# Patient Record
Sex: FEMALE | Race: OTHER | HISPANIC OR LATINO | Employment: FULL TIME | URBAN - METROPOLITAN AREA
[De-identification: names, ages, dates, MRNs, and addresses within clinical notes are randomized per-mention and may not be internally consistent; named-entity substitution may affect disease eponyms.]

---

## 2017-10-01 ENCOUNTER — HOSPITAL ENCOUNTER (EMERGENCY)
Facility: HOSPITAL | Age: 32
Discharge: HOME | End: 2017-10-02
Attending: EMERGENCY MEDICINE
Payer: OTHER GOVERNMENT

## 2017-10-01 VITALS
RESPIRATION RATE: 18 BRPM | HEART RATE: 113 BPM | OXYGEN SATURATION: 99 % | BODY MASS INDEX: 24.19 KG/M2 | WEIGHT: 120 LBS | SYSTOLIC BLOOD PRESSURE: 115 MMHG | TEMPERATURE: 99 F | HEIGHT: 59 IN | DIASTOLIC BLOOD PRESSURE: 68 MMHG
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
## 2017-10-01 DIAGNOSIS — L03.115 CELLULITIS OF RIGHT LOWER EXTREMITY: ICD-10-CM

## 2017-10-01 PROCEDURE — 81025 URINE PREGNANCY TEST: CPT

## 2017-10-01 PROCEDURE — 99283 EMERGENCY DEPT VISIT LOW MDM: CPT | Mod: 25

## 2017-10-01 PROCEDURE — 99284 EMERGENCY DEPT VISIT MOD MDM: CPT | Mod: ,,,

## 2017-10-02 LAB
B-HCG UR QL: NEGATIVE
CTP QC/QA: YES

## 2017-10-02 RX ORDER — CEPHALEXIN 500 MG/1
500 CAPSULE ORAL EVERY 12 HOURS
Qty: 10 CAPSULE | Refills: 0 | Status: SHIPPED | OUTPATIENT
Start: 2017-10-02 | End: 2017-10-07

## 2017-10-02 RX ORDER — HYDROCORTISONE 1 %
CREAM (GRAM) TOPICAL
Qty: 30 G | Refills: 0 | Status: SHIPPED | OUTPATIENT
Start: 2017-10-02 | End: 2019-01-18

## 2017-10-02 NOTE — ED PROVIDER NOTES
Encounter Date: 10/1/2017       History     Chief Complaint   Patient presents with    Insect Bite     Pt has insect bite on right hand/right leg. Noticed Friday.      32 y.o. female with no significant past medical history presents to ED with insect bite. Patient states bite occurred on Friday night. Two bites noted, one to MIP of 1st digit and second to right thigh. Patient states redness of thigh has increased. Denies drainage, fever, chills, nausea, vomiting, chest pain, shortness of breath, weakness, syncope.           Review of patient's allergies indicates:  No Known Allergies  Past Medical History:   Diagnosis Date    Abnormal cervical Pap smear with positive HPV DNA test 2011    pt states negative biopsy    Abnormal Pap smear 2010    ADD (attention deficit disorder) 4/24/2014    Adderall XR 20mg qd, 5 mg plain in pm, tried Concerta but didn't help, ADD eval on chart 2013    Allergic dermatitis     takes medrol prior to & after getting tatoos bc of hives    Seborrheic dermatitis of scalp     ketoconazole 2% shampoo helps     Past Surgical History:   Procedure Laterality Date    breast augmentation      COLPOSCOPY       Family History   Problem Relation Age of Onset    Breast cancer Neg Hx     Cancer Neg Hx     Ovarian cancer Neg Hx      Social History   Substance Use Topics    Smoking status: Never Smoker    Smokeless tobacco: Never Used    Alcohol use Yes      Comment: socially     Review of Systems   Constitutional: Negative for chills, diaphoresis, fatigue and fever.   HENT: Negative for congestion and sore throat.    Respiratory: Negative for cough and shortness of breath.    Cardiovascular: Negative for chest pain.   Gastrointestinal: Negative for abdominal pain and nausea.   Genitourinary: Negative for dysuria.   Musculoskeletal: Negative for back pain.   Skin: Positive for wound. Negative for rash.        itching   Neurological: Negative for weakness, light-headedness and headaches.    Hematological: Does not bruise/bleed easily.   Psychiatric/Behavioral: The patient is not nervous/anxious.        Physical Exam     Initial Vitals [10/01/17 2319]   BP Pulse Resp Temp SpO2   115/68 (!) 113 18 98.5 °F (36.9 °C) 99 %      MAP       83.67         Physical Exam    Vitals reviewed.  Constitutional: Vital signs are normal. She appears well-developed and well-nourished. She is not diaphoretic. No distress.   HENT:   Head: Normocephalic and atraumatic.   Nose: Nose normal.   Mouth/Throat: Oropharynx is clear and moist.   Eyes: Conjunctivae, EOM and lids are normal. Pupils are equal, round, and reactive to light. Lids are everted and swept, no foreign bodies found. Right eye exhibits no discharge. Left eye exhibits no discharge.   Neck: Trachea normal and normal range of motion. Neck supple.   Cardiovascular: Normal rate, regular rhythm, intact distal pulses and normal pulses.   Pulmonary/Chest: Breath sounds normal. She has no wheezes. She has no rhonchi. She has no rales. She exhibits no tenderness.   Abdominal: Soft. Normal appearance and bowel sounds are normal. There is no tenderness. There is no rebound.   Musculoskeletal: Normal range of motion. She exhibits no edema or tenderness.   Neurological: She is alert and oriented to person, place, and time. She has normal strength. No sensory deficit.   Skin: Skin is warm. Capillary refill takes less than 2 seconds. No abscess noted. There is erythema. No cyanosis.        6.5cm x 4cm cellulitic area noted to right thigh. No induration or fluctuance. 1cm x 1cm erythema noted to MIP of 1st finger. Sensation and strength intact.    Psychiatric: She has a normal mood and affect.         ED Course   Procedures  Labs Reviewed   POCT URINE PREGNANCY - Normal                   APC / Resident Notes:   32 y.o. female with no significant past medical history presents to ED with insect bite.    DDX includes but is not limited to cellulitis, abscess, dermatitis, insect  bite. UPT negative. Do not suspect systemic infection so will not get labs. Discharged to home in stable condition, return to ED warnings given, follow up and patient care instructions given. Patient discharged home with keflex 500mg and hydrocortisone cream.       I have discussed and reviewed with my supervising physician.         Attending Attestation:     Physician Attestation Statement for NP/PA:   I have conducted a face to face encounter with this patient in addition to the NP/PA, due to NP/PA Request    Other NP/PA Attestation Additions:     Physical Exam: Punctate erythematous papule R thumb, no cellulitis, not involving joint, nontender, FROM, nvi    R medial thigh:  Papule with surrounding erythema, no drainage/induration/fluctuance/crepitus, minimal tenderness, slightly excoriated    Op clear  Lungs clear   Medical Decision Making: Imp;  Probable local allergic rxn to suspected insect bite, less likely infectious.  However, given degree of erythema, higher concern for early cellulitis - will start abx.  Well-appearing, no systemic sxs.  Advised on home care, suspected dx, reasons to return - pt indicates understanding.                   ED Course      Clinical Impression:   The primary encounter diagnosis was Insect bite, initial encounter. A diagnosis of Cellulitis of right lower extremity was also pertinent to this visit.    Disposition:   Disposition: Discharged  Condition: Stable                        Sharlene Montilla PA-C  10/02/17 0134       Jarrod Boyd MD  10/03/17 8610

## 2017-10-02 NOTE — ED TRIAGE NOTES
Pt reports insect bites to right thigh and right hand around midnight Friday night. Reports redness and itchiness. States unsure what bit her but states the redness is getting worse.

## 2017-10-02 NOTE — ED NOTES
Pt identifiers Dyan Zavala and correct  LOC: The patient is awake, alert, aware of environment with an appropriate affect. Oriented x3, speaking appropriately  APPEARANCE: Pt resting comfortably, in no acute distress, pt is clean and well groomed, clothing properly fastened  SKIN: Skin warm, dry and intact, normal skin turgor, moist mucus membranes. Insect bite noted to right thigh with surrounding redness about 5cm in diameter. Bite noted to right hand between thumb and index finger, no surrounding redness.   RESPIRATORY: Airway is open and patent, respirations are spontaneous, even and unlabored, normal effort and rate

## 2018-03-15 ENCOUNTER — OFFICE VISIT (OUTPATIENT)
Dept: OBSTETRICS AND GYNECOLOGY | Facility: CLINIC | Age: 33
End: 2018-03-15
Payer: OTHER GOVERNMENT

## 2018-03-15 VITALS
WEIGHT: 147.06 LBS | BODY MASS INDEX: 29.65 KG/M2 | HEIGHT: 59 IN | DIASTOLIC BLOOD PRESSURE: 80 MMHG | SYSTOLIC BLOOD PRESSURE: 122 MMHG

## 2018-03-15 DIAGNOSIS — R10.32 BILATERAL LOWER ABDOMINAL CRAMPING: ICD-10-CM

## 2018-03-15 DIAGNOSIS — N94.10 DYSPAREUNIA IN FEMALE: ICD-10-CM

## 2018-03-15 DIAGNOSIS — R10.31 BILATERAL LOWER ABDOMINAL CRAMPING: ICD-10-CM

## 2018-03-15 DIAGNOSIS — Z11.3 SCREEN FOR STD (SEXUALLY TRANSMITTED DISEASE): Primary | ICD-10-CM

## 2018-03-15 PROCEDURE — 99213 OFFICE O/P EST LOW 20 MIN: CPT | Mod: PBBFAC

## 2018-03-15 PROCEDURE — 87491 CHLMYD TRACH DNA AMP PROBE: CPT

## 2018-03-15 PROCEDURE — 87480 CANDIDA DNA DIR PROBE: CPT

## 2018-03-15 PROCEDURE — 99203 OFFICE O/P NEW LOW 30 MIN: CPT | Mod: S$PBB,,, | Performed by: NURSE PRACTITIONER

## 2018-03-15 PROCEDURE — 99999 PR PBB SHADOW E&M-EST. PATIENT-LVL III: CPT | Mod: PBBFAC,,,
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
## 2018-03-16 LAB
C TRACH DNA SPEC QL NAA+PROBE: NOT DETECTED
CANDIDA RRNA VAG QL PROBE: NEGATIVE
G VAGINALIS RRNA GENITAL QL PROBE: POSITIVE
N GONORRHOEA DNA SPEC QL NAA+PROBE: NOT DETECTED
T VAGINALIS RRNA GENITAL QL PROBE: NEGATIVE

## 2018-03-19 ENCOUNTER — PATIENT MESSAGE (OUTPATIENT)
Dept: OBSTETRICS AND GYNECOLOGY | Facility: CLINIC | Age: 33
End: 2018-03-19

## 2018-03-19 RX ORDER — TINIDAZOLE 500 MG/1
2 TABLET ORAL DAILY
Qty: 8 TABLET | Refills: 0 | Status: SHIPPED | OUTPATIENT
Start: 2018-03-19 | End: 2018-03-21

## 2018-03-23 ENCOUNTER — TELEPHONE (OUTPATIENT)
Dept: OBSTETRICS AND GYNECOLOGY | Facility: CLINIC | Age: 33
End: 2018-03-23

## 2018-03-26 ENCOUNTER — OFFICE VISIT (OUTPATIENT)
Dept: OBSTETRICS AND GYNECOLOGY | Facility: CLINIC | Age: 33
End: 2018-03-26
Payer: OTHER GOVERNMENT

## 2018-03-26 VITALS
DIASTOLIC BLOOD PRESSURE: 66 MMHG | BODY MASS INDEX: 30.73 KG/M2 | SYSTOLIC BLOOD PRESSURE: 100 MMHG | HEIGHT: 59 IN | WEIGHT: 152.44 LBS

## 2018-03-26 DIAGNOSIS — N93.8 DUB (DYSFUNCTIONAL UTERINE BLEEDING): ICD-10-CM

## 2018-03-26 DIAGNOSIS — N94.10 DYSPAREUNIA IN FEMALE: Primary | ICD-10-CM

## 2018-03-26 PROCEDURE — 87086 URINE CULTURE/COLONY COUNT: CPT

## 2018-03-26 PROCEDURE — 99999 PR PBB SHADOW E&M-EST. PATIENT-LVL III: CPT | Mod: PBBFAC,,, | Performed by: OBSTETRICS & GYNECOLOGY

## 2018-03-26 PROCEDURE — 99214 OFFICE O/P EST MOD 30 MIN: CPT | Mod: S$PBB,,, | Performed by: OBSTETRICS & GYNECOLOGY

## 2018-03-26 PROCEDURE — 99213 OFFICE O/P EST LOW 20 MIN: CPT | Mod: PBBFAC,PN | Performed by: OBSTETRICS & GYNECOLOGY

## 2018-03-26 NOTE — PROGRESS NOTES
03/28/18 U/S  FINDINGS:  The uterus is normal in size and measures 8.9 x 4.4 x 4.9 cm.  The endometrial stripe is upper normal and measures 14mm.  There is a 7 mm intramural fibroid along the right body of the uterus.  The ovaries are normal in size and appearance.  The right ovary measures 3.1 x 3.3 x 1.5 cm.   The left ovary measures 3.2 x 2.2 x 2.7cm.  A 1.6 cm mildly complex area in the left ovary likely an involuting hemorrhagic follicle.  There is appropriate flow in the ovaries.      Impression     No significant abnormality demonstrated..   WNL      32 F in for dyspareunia x 3-4 days. She was treated for BV early last week, reports bleeding the evening after intercourse & the following morning which then resolved. She had cramping and pain after intercourse this weekend.  No pain with intercourse. NO UTI SX.    ROS:  GENERAL: No fever, chills, fatigability or weight loss.  VULVAR: No pain, no lesions and no itching.  VAGINAL: No relaxation, no itching, no discharge, no abnormal bleeding and no lesions.  ABDOMEN: No abdominal pain. Denies nausea. Denies vomiting. No diarrhea. No constipation  BREAST: Denies pain. No lumps. No discharge.  URINARY: No incontinence, no nocturia, no frequency and no dysuria.  CARDIOVASCULAR: No chest pain. No shortness of breath. No leg cramps.  NEUROLOGICAL: No headaches. No vision changes.  The remainder of the review of systems was negative.    PE:  General Appearance: overweight And Well developed. Well nourished. In no acute distress.  Vulva: Lesions: No.  Urethral Meatus: Normal size. Normal location. No lesions. No prolapse.  Urethra: No masses. No tenderness. No prolapse. No scarring.  Bladder: No masses. No tenderness.  Vagina: Mucosa NI:yes discharge no, atrophy no, cystocele no or rectocele no.  Cervix: Lesion: no  Stenotic: no Cervical motion tenderness: no  Uterus: Uterus size: 6 weeks. Support good. Uterus size: Normal  Adnexa: Masses: No Tenderness: No CDS  Nodularity: No  NO PAIN REPRODUCED  Abdomen: overweight No masses. No tenderness.  CHEST: CTA B  HEART: RRR  EXT: NO EDEMA        PROCEDURES:    PLAN:      DIAGNOSIS:  1. Dyspareunia in female    2. DUB (dysfunctional uterine bleeding)        MEDICATIONS & ORDERS:  Orders Placed This Encounter    Urine culture    US Pelvis Comp with Transvag NON-OB (xpd    Urinalysis       Patient was counseled today on the new ACS guidelines for cervical cytology screening as well as the current recommendations for breast cancer screening. She was counseled to follow up with her PCP for other routine health maintenance. Counseling session lasted approximately 10 minutes, and all her questions were answered.         FOLLOW-UP: With me in PRN month

## 2018-03-27 ENCOUNTER — HOSPITAL ENCOUNTER (OUTPATIENT)
Dept: RADIOLOGY | Facility: OTHER | Age: 33
Discharge: HOME | End: 2018-03-27
Attending: OBSTETRICS & GYNECOLOGY
Payer: OTHER GOVERNMENT

## 2018-03-27 DIAGNOSIS — N94.10 DYSPAREUNIA IN FEMALE: ICD-10-CM

## 2018-03-27 PROCEDURE — 76830 TRANSVAGINAL US NON-OB: CPT | Mod: TC

## 2018-03-27 PROCEDURE — 76856 US EXAM PELVIC COMPLETE: CPT | Mod: 26,,, | Performed by: INTERNAL MEDICINE

## 2018-03-27 PROCEDURE — 76830 TRANSVAGINAL US NON-OB: CPT | Mod: 26,,, | Performed by: INTERNAL MEDICINE

## 2018-03-28 LAB
BACTERIA UR CULT: NORMAL
BACTERIA UR CULT: NORMAL

## 2018-03-29 ENCOUNTER — TELEPHONE (OUTPATIENT)
Dept: OBSTETRICS AND GYNECOLOGY | Facility: CLINIC | Age: 33
End: 2018-03-29

## 2018-03-29 NOTE — TELEPHONE ENCOUNTER
----- Message from Marshall Valentin Jr., MD sent at 3/29/2018  9:46 AM CDT -----  SHE NEEDS A CATH UA. THIS WAS CONTAMINATED

## 2018-03-29 NOTE — TELEPHONE ENCOUNTER
Pt notified of contaminated urine specimen and that Dr Valentin recommends a cath UA. Pt scheduled appt for cath UA. Pt verbalized understanding.

## 2018-03-29 NOTE — TELEPHONE ENCOUNTER
----- Message from Arti Elizondo LPN sent at 3/29/2018  2:39 PM CDT -----  Contact: Patient      ----- Message -----  From: Ashley Reyes  Sent: 3/29/2018   1:50 PM  To: Homero ROSAS Jr Staff     _1st Request  _  2nd Request  _  3rd Request    Who:JA JOHNSON [[id number]]    Why:Patient was returning a missed call back from the staff     What Number to Call Back:[phone]    When to Expect a call back: (Before the end of the day)   -- if call after 3:00 call back will be tomorrow.

## 2018-09-08 ENCOUNTER — HOSPITAL ENCOUNTER (EMERGENCY)
Facility: HOSPITAL | Age: 33
Discharge: HOME | End: 2018-09-09
Attending: EMERGENCY MEDICINE
Payer: OTHER GOVERNMENT

## 2018-09-08 VITALS
WEIGHT: 151 LBS | HEIGHT: 59 IN | DIASTOLIC BLOOD PRESSURE: 70 MMHG | RESPIRATION RATE: 18 BRPM | HEART RATE: 115 BPM | SYSTOLIC BLOOD PRESSURE: 125 MMHG | TEMPERATURE: 99 F | BODY MASS INDEX: 30.44 KG/M2 | OXYGEN SATURATION: 96 %

## 2018-09-08 DIAGNOSIS — M79.642 LEFT HAND PAIN: ICD-10-CM

## 2018-09-08 DIAGNOSIS — T14.90XA TRAUMA: ICD-10-CM

## 2018-09-08 DIAGNOSIS — M25.522 LEFT ELBOW PAIN: ICD-10-CM

## 2018-09-08 DIAGNOSIS — R11.0 NAUSEA: ICD-10-CM

## 2018-09-08 LAB
B-HCG UR QL: NEGATIVE
CTP QC/QA: YES

## 2018-09-08 PROCEDURE — 25000003 PHARM REV CODE 250: Performed by: EMERGENCY MEDICINE

## 2018-09-08 PROCEDURE — 99284 EMERGENCY DEPT VISIT MOD MDM: CPT | Mod: 25

## 2018-09-08 PROCEDURE — 93010 ELECTROCARDIOGRAM REPORT: CPT | Mod: ,,, | Performed by: INTERNAL MEDICINE

## 2018-09-08 PROCEDURE — 93005 ELECTROCARDIOGRAM TRACING: CPT

## 2018-09-08 PROCEDURE — 81025 URINE PREGNANCY TEST: CPT | Performed by: EMERGENCY MEDICINE

## 2018-09-08 PROCEDURE — 99284 EMERGENCY DEPT VISIT MOD MDM: CPT | Mod: ,,, | Performed by: EMERGENCY MEDICINE

## 2018-09-08 RX ORDER — ACETAMINOPHEN 500 MG
1000 TABLET ORAL
Status: COMPLETED | OUTPATIENT
Start: 2018-09-08 | End: 2018-09-08

## 2018-09-08 RX ORDER — METHOCARBAMOL 500 MG/1
1000 TABLET, FILM COATED ORAL
Status: COMPLETED | OUTPATIENT
Start: 2018-09-08 | End: 2018-09-08

## 2018-09-08 RX ORDER — BACITRACIN ZINC 500 UNIT/G
OINTMENT IN PACKET (EA) TOPICAL
Status: COMPLETED | OUTPATIENT
Start: 2018-09-08 | End: 2018-09-08

## 2018-09-08 RX ADMIN — ACETAMINOPHEN 1000 MG: 500 TABLET ORAL at 10:09

## 2018-09-08 RX ADMIN — BACITRACIN 1 EACH: 500 OINTMENT TOPICAL at 10:09

## 2018-09-08 RX ADMIN — METHOCARBAMOL 1000 MG: 500 TABLET ORAL at 10:09

## 2018-09-08 RX ADMIN — CLOSTRIDIUM TETANI TOXOID ANTIGEN (FORMALDEHYDE INACTIVATED), CORYNEBACTERIUM DIPHTHERIAE TOXOID ANTIGEN (FORMALDEHYDE INACTIVATED), BORDETELLA PERTUSSIS TOXOID ANTIGEN (GLUTARALDEHYDE INACTIVATED), BORDETELLA PERTUSSIS FILAMENTOUS HEMAGGLUTININ ANTIGEN (FORMALDEHYDE INACTIVATED), BORDETELLA PERTUSSIS PERTACTIN ANTIGEN, AND BORDETELLA PERTUSSIS FIMBRIAE 2/3 ANTIGEN 0.5 ML: 5; 2; 2.5; 5; 3; 5 INJECTION, SUSPENSION INTRAMUSCULAR at 10:09

## 2018-09-09 PROCEDURE — 90715 TDAP VACCINE 7 YRS/> IM: CPT | Performed by: EMERGENCY MEDICINE

## 2018-09-09 PROCEDURE — 63600175 PHARM REV CODE 636 W HCPCS: Performed by: EMERGENCY MEDICINE

## 2018-09-09 PROCEDURE — 90471 IMMUNIZATION ADMIN: CPT | Performed by: EMERGENCY MEDICINE

## 2018-09-09 RX ORDER — METHOCARBAMOL 500 MG/1
1000 TABLET, FILM COATED ORAL 3 TIMES DAILY
Qty: 30 TABLET | Refills: 0 | Status: SHIPPED | OUTPATIENT
Start: 2018-09-09 | End: 2018-09-14
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
## 2018-09-11 ENCOUNTER — HOSPITAL ENCOUNTER (EMERGENCY)
Facility: HOSPITAL | Age: 33
Discharge: HOME | End: 2018-09-11
Attending: EMERGENCY MEDICINE
Payer: OTHER GOVERNMENT

## 2018-09-11 VITALS
RESPIRATION RATE: 20 BRPM | BODY MASS INDEX: 30.04 KG/M2 | DIASTOLIC BLOOD PRESSURE: 77 MMHG | WEIGHT: 149 LBS | HEART RATE: 107 BPM | TEMPERATURE: 99 F | OXYGEN SATURATION: 98 % | SYSTOLIC BLOOD PRESSURE: 132 MMHG | HEIGHT: 59 IN

## 2018-09-11 DIAGNOSIS — L03.119 CELLULITIS OF FOOT: ICD-10-CM

## 2018-09-11 PROCEDURE — 99284 EMERGENCY DEPT VISIT MOD MDM: CPT | Mod: 25

## 2018-09-11 PROCEDURE — 25000003 PHARM REV CODE 250: Performed by: EMERGENCY MEDICINE

## 2018-09-11 PROCEDURE — 96374 THER/PROPH/DIAG INJ IV PUSH: CPT

## 2018-09-11 PROCEDURE — 99284 EMERGENCY DEPT VISIT MOD MDM: CPT | Mod: ,,, | Performed by: EMERGENCY MEDICINE

## 2018-09-11 PROCEDURE — S0077 INJECTION, CLINDAMYCIN PHOSP: HCPCS | Performed by: EMERGENCY MEDICINE

## 2018-09-11 RX ORDER — CLINDAMYCIN PHOSPHATE 900 MG/50ML
900 INJECTION, SOLUTION INTRAVENOUS
Status: COMPLETED | OUTPATIENT
Start: 2018-09-11 | End: 2018-09-11

## 2018-09-11 RX ORDER — NAPROXEN SODIUM 550 MG/1
550 TABLET ORAL EVERY 12 HOURS PRN
Qty: 20 TABLET | Refills: 0 | Status: SHIPPED | OUTPATIENT
Start: 2018-09-11 | End: 2019-01-18

## 2018-09-11 RX ORDER — CLINDAMYCIN HYDROCHLORIDE 150 MG/1
300 CAPSULE ORAL 4 TIMES DAILY
Qty: 56 CAPSULE | Refills: 0 | Status: SHIPPED | OUTPATIENT
Start: 2018-09-11 | End: 2018-09-18

## 2018-09-11 RX ORDER — NAPROXEN 500 MG/1
500 TABLET ORAL
Status: COMPLETED | OUTPATIENT
Start: 2018-09-11 | End: 2018-09-11

## 2018-09-11 RX ADMIN — NAPROXEN 500 MG: 500 TABLET ORAL at 09:09

## 2018-09-11 RX ADMIN — CLINDAMYCIN IN 5 PERCENT DEXTROSE 900 MG: 18 INJECTION, SOLUTION INTRAVENOUS at 09:09
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
## 2018-09-12 NOTE — ED TRIAGE NOTES
Pt presents to ED c/o wound to top of left foot that happened on Saturday. Pt was here Saturday and was given tetanus shot and d/c'ed. Pt reports increased redness, swelling, and pus to wound. Pt denies fever or chills.     LOC: Patient name and date of birth verified.  The patient is awake, alert and aware of environment with an appropriate affect, the patient is oriented x 3 and speaking appropriately.  Pt in NAD.    APPEARANCE: Patient resting comfortably and in no acute distress, patient is clean and well groomed, patient's clothing is properly fastened.  SKIN: The skin is warm and dry, color consistent with ethnicity, patient has normal skin turgor and moist mucus membranes, wound to top of left foot, redness and swelling noted.   MUSCULOSKELETAL: Patient moving all extremities well, no obvious swelling or deformities noted.  RESPIRATORY: Airway is open and patent, respirations are spontaneous, patient has a normal effort and rate, no accessory muscle use noted.  CARDIAC: Patient has a normal rate and rhythm, no periphreal edema noted, capillary refill < 3 seconds.  ABDOMEN: Soft and non tender to palpation, no distention noted. Bowel sounds present in all four quadrants.  NEUROLOGIC: Eyes open spontaneously, behavior appropriate to situation, follows commands, facial expression symmetrical, bilateral hand grasp equal and even, purposeful motor response noted, normal sensation in all extremities when touched with a finger.

## 2018-09-24 LAB — HPV 16/18: NEGATIVE

## 2018-10-02 ENCOUNTER — HOSPITAL ENCOUNTER (EMERGENCY)
Facility: HOSPITAL | Age: 33
Discharge: HOME | End: 2018-10-02
Attending: EMERGENCY MEDICINE
Payer: OTHER GOVERNMENT

## 2018-10-02 VITALS
DIASTOLIC BLOOD PRESSURE: 54 MMHG | RESPIRATION RATE: 18 BRPM | OXYGEN SATURATION: 100 % | SYSTOLIC BLOOD PRESSURE: 103 MMHG | BODY MASS INDEX: 29.64 KG/M2 | TEMPERATURE: 99 F | WEIGHT: 147 LBS | HEIGHT: 59 IN | HEART RATE: 76 BPM

## 2018-10-02 DIAGNOSIS — O21.0 HYPEREMESIS GRAVIDARUM: Primary | ICD-10-CM

## 2018-10-02 LAB
B-HCG UR QL: POSITIVE
BILIRUB UR QL STRIP: NEGATIVE
BUN SERPL-MCNC: 6 MG/DL (ref 6–30)
CHLORIDE SERPL-SCNC: 103 MMOL/L (ref 95–110)
CLARITY UR REFRACT.AUTO: CLEAR
COLOR UR AUTO: YELLOW
CREAT SERPL-MCNC: 0.5 MG/DL (ref 0.5–1.4)
CTP QC/QA: YES
GLUCOSE SERPL-MCNC: 110 MG/DL (ref 70–110)
GLUCOSE UR QL STRIP: NEGATIVE
HCT VFR BLD CALC: 38 %PCV (ref 36–54)
HGB UR QL STRIP: NEGATIVE
KETONES UR QL STRIP: ABNORMAL
LEUKOCYTE ESTERASE UR QL STRIP: NEGATIVE
NITRITE UR QL STRIP: NEGATIVE
PH UR STRIP: 6 [PH] (ref 5–8)
POC IONIZED CALCIUM: 1.19 MMOL/L (ref 1.06–1.42)
POC TCO2 (MEASURED): 22 MMOL/L (ref 23–29)
POTASSIUM BLD-SCNC: 4.2 MMOL/L (ref 3.5–5.1)
PROT UR QL STRIP: NEGATIVE
SAMPLE: ABNORMAL
SODIUM BLD-SCNC: 137 MMOL/L (ref 136–145)
SP GR UR STRIP: 1.01 (ref 1–1.03)
URN SPEC COLLECT METH UR: ABNORMAL
UROBILINOGEN UR STRIP-ACNC: NEGATIVE EU/DL

## 2018-10-02 PROCEDURE — 81003 URINALYSIS AUTO W/O SCOPE: CPT

## 2018-10-02 PROCEDURE — 63600175 PHARM REV CODE 636 W HCPCS: Performed by: EMERGENCY MEDICINE

## 2018-10-02 PROCEDURE — 96375 TX/PRO/DX INJ NEW DRUG ADDON: CPT

## 2018-10-02 PROCEDURE — 96374 THER/PROPH/DIAG INJ IV PUSH: CPT

## 2018-10-02 PROCEDURE — 99283 EMERGENCY DEPT VISIT LOW MDM: CPT | Mod: ,,, | Performed by: EMERGENCY MEDICINE

## 2018-10-02 PROCEDURE — 25000003 PHARM REV CODE 250: Performed by: EMERGENCY MEDICINE

## 2018-10-02 PROCEDURE — 81025 URINE PREGNANCY TEST: CPT | Performed by: EMERGENCY MEDICINE

## 2018-10-02 PROCEDURE — 99284 EMERGENCY DEPT VISIT MOD MDM: CPT | Mod: 25

## 2018-10-02 PROCEDURE — 96361 HYDRATE IV INFUSION ADD-ON: CPT

## 2018-10-02 RX ORDER — DIPHENHYDRAMINE HCL 25 MG
25 CAPSULE ORAL
Status: COMPLETED | OUTPATIENT
Start: 2018-10-02 | End: 2018-10-02

## 2018-10-02 RX ORDER — PROCHLORPERAZINE EDISYLATE 5 MG/ML
10 INJECTION INTRAMUSCULAR; INTRAVENOUS ONCE
Status: COMPLETED | OUTPATIENT
Start: 2018-10-02 | End: 2018-10-02

## 2018-10-02 RX ORDER — METOCLOPRAMIDE HYDROCHLORIDE 5 MG/ML
10 INJECTION INTRAMUSCULAR; INTRAVENOUS
Status: COMPLETED | OUTPATIENT
Start: 2018-10-02 | End: 2018-10-02

## 2018-10-02 RX ORDER — METOCLOPRAMIDE 10 MG/1
10 TABLET ORAL EVERY 6 HOURS
Qty: 30 TABLET | Refills: 0 | Status: SHIPPED | OUTPATIENT
Start: 2018-10-02 | End: 2018-12-26

## 2018-10-02 RX ADMIN — PROCHLORPERAZINE EDISYLATE 10 MG: 5 INJECTION INTRAMUSCULAR; INTRAVENOUS at 11:10

## 2018-10-02 RX ADMIN — METOCLOPRAMIDE 10 MG: 5 INJECTION, SOLUTION INTRAMUSCULAR; INTRAVENOUS at 08:10

## 2018-10-02 RX ADMIN — SODIUM CHLORIDE 1000 ML: 0.9 INJECTION, SOLUTION INTRAVENOUS at 09:10

## 2018-10-02 RX ADMIN — DIPHENHYDRAMINE HYDROCHLORIDE 25 MG: 25 CAPSULE ORAL at 11:10

## 2018-10-02 NOTE — ED PROVIDER NOTES
Encounter Date: 10/2/2018    SCRIBE #1 NOTE: I, Son Jeannine, am scribing for, and in the presence of,  Dr. Melton. I have scribed the following portions of the note - Other sections scribed: HPI ROS PE .       History     Chief Complaint   Patient presents with    Nausea     7 wks pregnant w/ n/v  for last two days. Denies abd pain. EDC 5/18/2019    Vomiting     Time patient was seen by the provider: 8:29 AM      The patient is a 33 y.o. female who is 7 weeks pregnant with co-morbidities including: ADD, Allergic dermatitis, Seborrheic dermatitis of scalp who presents to the ED with a complaint of worsening episodes of nausea and vomiting for the past 2 days . Reports of taking phenergan, but she has not been able to keep it down. She notes that these episodes has been constant, and it has kept her up at night. Denies abdominal pain.        The history is provided by the patient and medical records.     Review of patient's allergies indicates:  No Known Allergies  Past Medical History:   Diagnosis Date    Abnormal cervical Pap smear with positive HPV DNA test 2011    pt states negative biopsy    ADD (attention deficit disorder) 4/24/2014    Adderall XR 20mg qd, 5 mg plain in pm, tried Concerta but didn't help, ADD eval on chart 2013    Allergic dermatitis     takes medrol prior to & after getting tatoos bc of hives    Seborrheic dermatitis of scalp     ketoconazole 2% shampoo helps     Past Surgical History:   Procedure Laterality Date    breast augmentation      COLPOSCOPY       Family History   Problem Relation Age of Onset    Breast cancer Neg Hx     Cancer Neg Hx     Ovarian cancer Neg Hx      Social History     Tobacco Use    Smoking status: Never Smoker    Smokeless tobacco: Never Used   Substance Use Topics    Alcohol use: Yes     Comment: socially    Drug use: Yes     Types: Marijuana     Comment: today     Review of Systems   Constitutional: Negative.    HENT: Negative.    Respiratory:  Negative.    Cardiovascular: Negative.    Gastrointestinal: Positive for nausea and vomiting. Negative for abdominal pain.   Genitourinary: Negative.    Musculoskeletal: Negative.    Skin: Negative.    Neurological: Negative.        Physical Exam     Initial Vitals [10/02/18 0818]   BP Pulse Resp Temp SpO2   (!) 103/57 98 18 98.9 °F (37.2 °C) 98 %      MAP       --         Physical Exam    Vitals reviewed.  Constitutional: No distress.   Uncomfortable    HENT:   Head: Normocephalic and atraumatic.   Mouth/Throat: Mucous membranes are dry.   Eyes: EOM are normal. Pupils are equal, round, and reactive to light.   Neck: Normal range of motion. Neck supple.   Cardiovascular: Normal rate and regular rhythm.   Pulmonary/Chest: Breath sounds normal.   Abdominal: Soft. She exhibits no distension. There is no tenderness.   Musculoskeletal: Normal range of motion. She exhibits no edema.   Neurological: She is alert.   Skin: Skin is warm.         ED Course   Procedures  Labs Reviewed   URINALYSIS, REFLEX TO URINE CULTURE - Abnormal; Notable for the following components:       Result Value    Ketones, UA 3+ (*)     All other components within normal limits    Narrative:     Preferred Collection Type->Urine, Clean Catch   POCT URINE PREGNANCY - Abnormal; Notable for the following components:    POC Preg Test, Ur Positive (*)     All other components within normal limits   ISTAT PROCEDURE - Abnormal; Notable for the following components:    POC TCO2 (MEASURED) 22 (*)     All other components within normal limits          Imaging Results    None          Medical Decision Making:   History:   Old Medical Records: I decided to obtain old medical records.  Initial Assessment:   34 yo f, 7 WGA pregnancy, here with n/v x 3 days.  No abd pain/VB.  Not tolerating PO and unable to take phenergen rx'd by OB    Appears mildly dehydrated though HD stable, with benign abd exam    Suspect mild hyperemesis, less likely pathology like SBO given  lack of abd pain or tenderness  Clinical Tests:   Lab Tests: Ordered and Reviewed  ED Management:  Check electrolytes  Reglan/IVF  Reassess    10:50 AM  Electrolytes normal and pt reports feeling much better  Will give PO challenge and if able to tolerate, will d/c home    12:22 PM  Will d/c            Scribe Attestation:   Scribe #1: I performed the above scribed service and the documentation accurately describes the services I performed. I attest to the accuracy of the note.    I, Dr. Luisa Melton, personally performed the services described in this documentation. All medical record entries made by the scribe were at my direction and in my presence.  I have reviewed the chart and agree that the record reflects my personal performance and is accurate and complete. Luisa Melton MD.  9:11 AM 10/09/2018               Clinical Impression:   The encounter diagnosis was Hyperemesis gravidarum.      Disposition:   Disposition: Discharged  Condition: Stable                        Luisa Melton MD  10/09/18 0837

## 2018-11-29 RX ORDER — TINIDAZOLE 500 MG/1
2 TABLET ORAL DAILY
Qty: 8 TABLET | Refills: 0 | OUTPATIENT
Start: 2018-11-29 | End: 2018-12-01

## 2018-12-26 ENCOUNTER — ANESTHESIA EVENT (OUTPATIENT)
Dept: OBSTETRICS AND GYNECOLOGY | Facility: OTHER | Age: 33
End: 2018-12-26

## 2018-12-26 ENCOUNTER — HOSPITAL ENCOUNTER (INPATIENT)
Facility: OTHER | Age: 33
LOS: 1 days | Discharge: HOME | End: 2018-12-28
Attending: EMERGENCY MEDICINE | Admitting: PEDIATRICS
Payer: OTHER GOVERNMENT

## 2018-12-26 ENCOUNTER — ANESTHESIA (OUTPATIENT)
Dept: OBSTETRICS AND GYNECOLOGY | Facility: OTHER | Age: 33
End: 2018-12-26

## 2018-12-26 DIAGNOSIS — N89.8 VAGINAL DISCHARGE: ICD-10-CM

## 2018-12-26 DIAGNOSIS — O42.919 PRETERM PREMATURE RUPTURE OF MEMBRANES (PPROM) WITH UNKNOWN ONSET OF LABOR: ICD-10-CM

## 2018-12-26 DIAGNOSIS — O42.90 LEAKAGE OF AMNIOTIC FLUID: ICD-10-CM

## 2018-12-26 DIAGNOSIS — Z03.71 ENCOUNTER FOR SUSPECTED PREMATURE RUPTURE OF AMNIOTIC MEMBRANES, WITH RUPTURE OF MEMBRANES NOT FOUND: ICD-10-CM

## 2018-12-26 DIAGNOSIS — Z3A.19 19 WEEKS GESTATION OF PREGNANCY: ICD-10-CM

## 2018-12-26 LAB
ABO + RH BLD: NORMAL
ALBUMIN SERPL BCP-MCNC: 2.7 G/DL
ALP SERPL-CCNC: 70 U/L
ALT SERPL W/O P-5'-P-CCNC: 7 U/L
ANION GAP SERPL CALC-SCNC: 10 MMOL/L
AST SERPL-CCNC: 11 U/L
BASOPHILS # BLD AUTO: 0.06 K/UL
BASOPHILS NFR BLD: 0.5 %
BILIRUB SERPL-MCNC: 0.3 MG/DL
BLD GP AB SCN CELLS X3 SERPL QL: NORMAL
BUN SERPL-MCNC: 4 MG/DL
CALCIUM SERPL-MCNC: 8.8 MG/DL
CHLORIDE SERPL-SCNC: 108 MMOL/L
CO2 SERPL-SCNC: 19 MMOL/L
CREAT SERPL-MCNC: 0.6 MG/DL
DIFFERENTIAL METHOD: ABNORMAL
EOSINOPHIL # BLD AUTO: 0.2 K/UL
EOSINOPHIL NFR BLD: 1.5 %
ERYTHROCYTE [DISTWIDTH] IN BLOOD BY AUTOMATED COUNT: 13.6 %
EST. GFR  (AFRICAN AMERICAN): >60 ML/MIN/1.73 M^2
EST. GFR  (NON AFRICAN AMERICAN): >60 ML/MIN/1.73 M^2
GLUCOSE SERPL-MCNC: 92 MG/DL
HCT VFR BLD AUTO: 31.4 %
HGB BLD-MCNC: 11 G/DL
IMM GRANULOCYTES # BLD AUTO: 0.12 K/UL
IMM GRANULOCYTES NFR BLD AUTO: 0.9 %
INR PPP: 1
LYMPHOCYTES # BLD AUTO: 2.3 K/UL
LYMPHOCYTES NFR BLD: 17.6 %
MCH RBC QN AUTO: 31.4 PG
MCHC RBC AUTO-ENTMCNC: 35 G/DL
MCV RBC AUTO: 90 FL
MONOCYTES # BLD AUTO: 0.8 K/UL
MONOCYTES NFR BLD: 6.3 %
NEUTROPHILS # BLD AUTO: 9.7 K/UL
NEUTROPHILS NFR BLD: 73.2 %
NRBC BLD-RTO: 0 /100 WBC
PLATELET # BLD AUTO: 190 K/UL
PMV BLD AUTO: 10 FL
POTASSIUM SERPL-SCNC: 3.6 MMOL/L
PROT SERPL-MCNC: 6.3 G/DL
PROTHROMBIN TIME: 10.3 SEC
RBC # BLD AUTO: 3.5 M/UL
RUPTURE OF MEMBRANE: POSITIVE
SODIUM SERPL-SCNC: 137 MMOL/L
WBC # BLD AUTO: 13.26 K/UL

## 2018-12-26 PROCEDURE — 85610 PROTHROMBIN TIME: CPT

## 2018-12-26 PROCEDURE — 99284 PR EMERGENCY DEPT VISIT,LEVEL IV: ICD-10-PCS | Mod: ,,, | Performed by: OBSTETRICS & GYNECOLOGY

## 2018-12-26 PROCEDURE — 86850 RBC ANTIBODY SCREEN: CPT

## 2018-12-26 PROCEDURE — G0378 HOSPITAL OBSERVATION PER HR: HCPCS

## 2018-12-26 PROCEDURE — 99282 EMERGENCY DEPT VISIT SF MDM: CPT | Mod: ,,, | Performed by: EMERGENCY MEDICINE

## 2018-12-26 PROCEDURE — 84112 EVAL AMNIOTIC FLUID PROTEIN: CPT

## 2018-12-26 PROCEDURE — 99282 PR EMERGENCY DEPT VISIT,LEVEL II: ICD-10-PCS | Mod: ,,, | Performed by: EMERGENCY MEDICINE

## 2018-12-26 PROCEDURE — 80053 COMPREHEN METABOLIC PANEL: CPT

## 2018-12-26 PROCEDURE — 99284 EMERGENCY DEPT VISIT MOD MDM: CPT | Mod: ,,, | Performed by: OBSTETRICS & GYNECOLOGY

## 2018-12-26 PROCEDURE — 99285 EMERGENCY DEPT VISIT HI MDM: CPT | Mod: 25

## 2018-12-26 PROCEDURE — 85025 COMPLETE CBC W/AUTO DIFF WBC: CPT

## 2018-12-26 RX ORDER — DIPHENHYDRAMINE HCL 25 MG
25 CAPSULE ORAL EVERY 4 HOURS PRN
Status: DISCONTINUED | OUTPATIENT
Start: 2018-12-26 | End: 2018-12-28

## 2018-12-26 RX ORDER — PRENATAL WITH FERROUS FUM AND FOLIC ACID 3080; 920; 120; 400; 22; 1.84; 3; 20; 10; 1; 12; 200; 27; 25; 2 [IU]/1; [IU]/1; MG/1; [IU]/1; MG/1; MG/1; MG/1; MG/1; MG/1; MG/1; UG/1; MG/1; MG/1; MG/1; MG/1
1 TABLET ORAL DAILY
Status: DISCONTINUED | OUTPATIENT
Start: 2018-12-26 | End: 2018-12-27

## 2018-12-26 RX ORDER — DIPHENHYDRAMINE HYDROCHLORIDE 50 MG/ML
25 INJECTION INTRAMUSCULAR; INTRAVENOUS EVERY 4 HOURS PRN
Status: DISCONTINUED | OUTPATIENT
Start: 2018-12-26 | End: 2018-12-28

## 2018-12-26 RX ORDER — ONDANSETRON 8 MG/1
8 TABLET, ORALLY DISINTEGRATING ORAL EVERY 8 HOURS PRN
Status: DISCONTINUED | OUTPATIENT
Start: 2018-12-26 | End: 2018-12-28

## 2018-12-26 RX ORDER — SIMETHICONE 80 MG
1 TABLET,CHEWABLE ORAL EVERY 6 HOURS PRN
Status: DISCONTINUED | OUTPATIENT
Start: 2018-12-26 | End: 2018-12-28

## 2018-12-26 RX ORDER — AMOXICILLIN 250 MG
1 CAPSULE ORAL NIGHTLY PRN
Status: DISCONTINUED | OUTPATIENT
Start: 2018-12-26 | End: 2018-12-28

## 2018-12-26 NOTE — SUBJECTIVE & OBJECTIVE
Obstetric History       T0      L0     SAB0   TAB0   Ectopic0   Multiple0   Live Births0       # Outcome Date GA Lbr Odilon/2nd Weight Sex Delivery Anes PTL Lv   2 Current            1 AB                 Past Medical History:   Diagnosis Date    Abnormal cervical Pap smear with positive HPV DNA test     pt states negative biopsy    ADD (attention deficit disorder) 2014    Adderall XR 20mg qd, 5 mg plain in pm, tried Concerta but didn't help, ADD eval on chart     Allergic dermatitis     takes medrol prior to & after getting tatoos bc of hives    Seborrheic dermatitis of scalp     ketoconazole 2% shampoo helps     Past Surgical History:   Procedure Laterality Date    breast augmentation      COLPOSCOPY           (Not in a hospital admission)    Review of patient's allergies indicates:  No Known Allergies     Family History     None        Tobacco Use    Smoking status: Never Smoker    Smokeless tobacco: Never Used   Substance and Sexual Activity    Alcohol use: No     Frequency: Never     Comment: socially    Drug use: Yes     Types: Marijuana     Comment: today    Sexual activity: Yes     Partners: Male     Birth control/protection: None     Review of Systems   Constitutional: Negative for chills and fever.   Eyes: Negative for visual disturbance.   Respiratory: Negative for shortness of breath.    Cardiovascular: Negative for chest pain.   Gastrointestinal: Negative for abdominal pain, nausea and vomiting.   Genitourinary: Positive for vaginal discharge. Negative for vaginal bleeding.   Neurological: Negative for headaches.      Objective:     Vital Signs (Most Recent):  Temp: 98.4 °F (36.9 °C) (18)  Pulse: 68 (18 06)  Resp: 20 (18)  BP: 114/68 (18)  SpO2: 98 % (18 0536) Vital Signs (24h Range):  Temp:  [98.2 °F (36.8 °C)-98.4 °F (36.9 °C)] 98.4 °F (36.9 °C)  Pulse:  [68-87] 68  Resp:  [18-20] 20  SpO2:  [98 %-100 %] 98 %  BP:  (109-114)/(63-68) 114/68     Weight: 66.7 kg (147 lb)  Body mass index is 29.69 kg/m².    Fetal heart tones verified    Physical Exam  Vitals reviewed.  Constitutional: She appears well-developed and well-nourished. She is not diaphoretic. No distress.   HENT:   Head: Normocephalic and atraumatic.   Cardiovascular: Normal rate, regular rhythm and normal heart sounds.   Pulmonary/Chest: No respiratory distress.   Abdominal: Soft. There is no tenderness. There is no rebound and no guarding.   Musculoskeletal: She exhibits no edema or tenderness.   Neurological: She is alert and oriented to person, place, and time.   Skin: Skin is warm and dry.   Psychiatric: She has a normal mood and affect. Her behavior is normal. Judgment and thought content normal.      OB LABOR EXAM:   Pre-Term Labor: No.      Method: Sterile speculum exam per MD.   Vaginal Bleeding: none present.      Comments: SSE: Vaginal vault with scant amount of clear discharge. No lesions or bleeding. Cervix appears closed and without lesions. ROM+ collected. Negative pooling and valsalva. Negative nitrazine. Negative ferning.     Significant Labs:  Lab Results   Component Value Date    [facility] A POS 12/26/2018   ROM+ POSITIVE  I have personallly reviewed all pertinent lab results from the last 24 hours.

## 2018-12-26 NOTE — HOSPITAL COURSE
12/26/2018 Admitted to observation. Rule out ROM.   12/27/18 ROM confirmed. Pt opted for induction of labor which proceeded without complication.   12/28/2018 Pt doing well post delivery. Grieving appropriately, stable for discharge.

## 2018-12-26 NOTE — ASSESSMENT & PLAN NOTE
--negative pooling, ferning, nitrazine  --MVP > 2 on ultrasound  --ROM+ positive, and story suspicious for PPROM  --will admit to observation --> repeat spec exam and ultrasound prior to discharge  --will not give steroids/Mg/PCN as infant is previable  --consents for delivery/d&c/blood signed  --type and screen/CBC performed at [facility] [place]

## 2018-12-26 NOTE — ED PROVIDER NOTES
"Encounter Date: 2018       History     Chief Complaint   Patient presents with    Pregnancy Issue     Patient reports that she is "leaking clear fluid". Patient states that she is 19 weeks pregnant. Denies contractions. 1st pregnancy. Patient's OB is at [facility]    Rupture of Membranes     Dyan Ferrara is a 33 y.o. [id number] at 19w4d presents as a transfer from [facility] for possible PPROM. Patient states that yesterday at 1100 she noticed a clear discharge that soaked her underwear. She assumed it was urine or discharge and continued to have small amount of clear discharge throughout the day. At 0400 this morning, patient woke up and noticed that her panty liner and underwear were soaked through with clear fluid. When she stood up, clear fluid ran down her leg and she decided to go to the ED. Patient receives all PNC at [facility] with Dr. Beard. She has a history of 3 SABs all before 8 weeks and all this year. She has no other significant PMHx. She denies contractions and vaginal bleeding. She reports fetal movement.          Review of patient's allergies indicates:  No Known Allergies  Past Medical History:   Diagnosis Date    Abnormal cervical Pap smear with positive HPV DNA test     pt states negative biopsy    ADD (attention deficit disorder) 2014    Adderall XR 20mg qd, 5 mg plain in pm, tried Concerta but didn't help, ADD eval on chart     Allergic dermatitis     takes medrol prior to & after getting tatoos bc of hives    Seborrheic dermatitis of scalp     ketoconazole 2% shampoo helps     Past Surgical History:   Procedure Laterality Date    breast augmentation      COLPOSCOPY       Family History   Problem Relation Age of Onset    Breast cancer Neg Hx     Cancer Neg Hx     Ovarian cancer Neg Hx      Social History     Tobacco Use    Smoking status: Never Smoker    Smokeless tobacco: Never Used   Substance Use Topics    Alcohol use: No     Frequency: Never     Comment: socially "    Drug use: Yes     Types: Marijuana     Comment: today     Review of Systems   Constitutional: Negative for activity change, appetite change, chills, fatigue and fever.   HENT: Negative for congestion and rhinorrhea.    Eyes: Negative for visual disturbance.   Respiratory: Negative for cough, shortness of breath and wheezing.    Cardiovascular: Negative for chest pain and palpitations.   Gastrointestinal: Positive for nausea. Negative for abdominal pain, diarrhea and vomiting.   Genitourinary: Positive for vaginal discharge. Negative for dysuria, pelvic pain, vaginal bleeding and vaginal pain.   Musculoskeletal: Negative for back pain.   Skin: Negative for rash.   Neurological: Negative for dizziness, light-headedness and headaches.   Psychiatric/Behavioral: Negative for agitation. The patient is nervous/anxious.        Physical Exam     Initial Vitals [12/26/18 0440]   BP Pulse Resp Temp SpO2   111/63 87 18 98.4 °F (36.9 °C) 99 %      MAP       --         Physical Exam    Vitals reviewed.  Constitutional: She appears well-developed and well-nourished. She is not diaphoretic. No distress.   HENT:   Head: Normocephalic and atraumatic.   Eyes: Right eye exhibits no discharge. Left eye exhibits no discharge.   Neck: Neck supple.   Cardiovascular: Normal rate, regular rhythm and normal heart sounds.   Pulmonary/Chest: No respiratory distress.   Abdominal: Soft. There is no tenderness. There is no rebound and no guarding.   Gravid, fundus NT   Musculoskeletal: She exhibits no edema or tenderness.   Neurological: She is alert and oriented to person, place, and time.   Skin: Skin is warm and dry. No rash noted. No erythema.   Psychiatric: She has a normal mood and affect. Her behavior is normal. Judgment and thought content normal.     OB LABOR EXAM:   Pre-Term Labor: No.     Method: Sterile speculum exam per MD.   Vaginal Bleeding: none present.               Comments: SSE: Vaginal vault with scant amount of clear  discharge. No lesions or bleeding. Cervix appears closed and without lesions. ROM+ collected. Negative pooling and valsalva. Negative nitrazine. Negative ferning.       ED Course   US - ED Performed - OB Limited 1 or More Gestations  Date/Time: 12/26/2018 7:04 AM  Performed by: Sia Mcadams MD  Authorized by: Hans Dillard MD   Comments: Fetus in agapito presentation with + gross fetal movements. MVP 2.25 cm. Placenta anterior with no evidence of previa. EFW of 276g, fetus is at 22%ile.        Labs Reviewed   CBC W/ AUTO DIFFERENTIAL - Abnormal; Notable for the following components:       Result Value    WBC 13.26 (*)     RBC 3.50 (*)     Hemoglobin 11.0 (*)     Hematocrit 31.4 (*)     MCH 31.4 (*)     Immature Granulocytes 0.9 (*)     Gran # (ANC) 9.7 (*)     Immature Grans (Abs) 0.12 (*)     Gran% 73.2 (*)     Lymph% 17.6 (*)     All other components within normal limits   COMPREHENSIVE METABOLIC PANEL - Abnormal; Notable for the following components:    CO2 19 (*)     BUN, Bld 4 (*)     Albumin 2.7 (*)     ALT 7 (*)     All other components within normal limits   PROTIME-INR   RUPTURE OF MEMBRANE   TYPE & SCREEN          Imaging Results    None          Medical Decision Making:   ED Management:  - VSS  - +FHTs (150s)  - SSE neg pooling, neg nitrazine, neg ferning  - Appears grossly intact on bedside u/s with MVP >2cm  - ROM plus positive  - Will admit for 24hr observation to establish clearer diagnosis              Attending Attestation:   Physician Attestation Statement for Resident:  As the supervising MD   Physician Attestation Statement: I have personally seen and examined this patient.   I agree with the above history. -:   As the supervising MD I agree with the above PE.    As the supervising MD I agree with the above treatment, course, plan, and disposition.  I was personally present during the critical portions of the procedure(s) performed by the resident and was immediately available in the  ED to provide services and assistance as needed during the entire procedure.  I have reviewed and agree with the residents interpretation of the following: lab data.  I have reviewed the following: old records at this facility.                    ED Course as of Dec 26 1115   Wed Dec 26, 2018   0851 I evaluated Ms. Ferrara and discussed case with Dr. Mcadams.  Pt presents with viable pregnancy at 19w4d with gush of fluid.  Exam did not indicate ROM, but story was consistent.  ROM sent, which returned positive.    I discussed results with pt and her partner, as well as possibility of false positive.   We discussed management options.   She is a [facility] pt, and is considering going there.    Pt reassured that we will help her in whatever way we can.  If she chooses to stay here, we would watch her overnight to r/o false positive.  Will follow up once decision made  []   0859 Pt continues to leak fluid.  She would like to stay here at this point.  Will involve MFM service  []      ED Course User Index  [HU] Ning oLzano DO     Clinical Impression:   The primary encounter diagnosis was Vaginal discharge. Diagnoses of 19 weeks gestation of pregnancy and Encounter for suspected premature rupture of amniotic membranes, with rupture of membranes not found were also pertinent to this visit.      Disposition:   Disposition: Admitted  Condition: Stable                        Sia Mcadams MD  Resident  12/26/18 0846       Sia Mcadams MD  Resident  12/26/18 0846       Ning Lozano DO  12/26/18 1116

## 2018-12-26 NOTE — HPI
Dyan Ferrara is a 33 y.o. [id number] at 19w4d presents as a transfer from [facility] for possible PPROM. Patient states that yesterday at 1100 she noticed a clear discharge that soaked her underwear. She assumed it was urine or discharge and continued to have small amount of clear discharge throughout the day. At 0400 this morning, patient woke up and noticed that her panty liner and underwear were soaked through with clear fluid. When she stood up, clear fluid ran down her leg and she decided to go to the ED. Patient receives all PNC at [facility] with Dr. Beard. She has a history of 3 SABs all before 8 weeks and all this year. She has no other significant PMHx. She denies contractions and vaginal bleeding. She reports fetal movement.

## 2018-12-26 NOTE — H&P
[facility]  Obstetrics  History & Physical    Patient Name: Dyan Ferrara  MRN: [id number]  Admission Date: 2018  Primary Care Provider: Lacy Sliva MD    Subjective:     Principal Problem:Leakage of amniotic fluid    History of Present Illness:  Dyan Ferrara is a 33 y.o. [id number] at 19w4d presents as a transfer from [facility] for possible PPROM. Patient states that yesterday at 1100 she noticed a clear discharge that soaked her underwear. She assumed it was urine or discharge and continued to have small amount of clear discharge throughout the day. At 0400 this morning, patient woke up and noticed that her panty liner and underwear were soaked through with clear fluid. When she stood up, clear fluid ran down her leg and she decided to go to the ED. Patient receives all PNC at [facility] with Dr. Beard. She has a history of 3 SABs all before 8 weeks and all this year. She has no other significant PMHx. She denies contractions and vaginal bleeding. She reports fetal movement.          Obstetric History       T0      L0     SAB0   TAB0   Ectopic0   Multiple0   Live Births0       # Outcome Date GA Lbr Odilon/2nd Weight Sex Delivery Anes PTL Lv   2 Current            1 AB                 Past Medical History:   Diagnosis Date    Abnormal cervical Pap smear with positive HPV DNA test     pt states negative biopsy    ADD (attention deficit disorder) 2014    Adderall XR 20mg qd, 5 mg plain in pm, tried Concerta but didn't help, ADD eval on chart     Allergic dermatitis     takes medrol prior to & after getting tatoos bc of hives    Seborrheic dermatitis of scalp     ketoconazole 2% shampoo helps     Past Surgical History:   Procedure Laterality Date    breast augmentation      COLPOSCOPY           (Not in a hospital admission)    Review of patient's allergies indicates:  No Known Allergies     Family History     None        Tobacco Use    Smoking status: Never  Smoker    Smokeless tobacco: Never Used   Substance and Sexual Activity    Alcohol use: No     Frequency: Never     Comment: socially    Drug use: Yes     Types: Marijuana     Comment: today    Sexual activity: Yes     Partners: Male     Birth control/protection: None     Review of Systems   Constitutional: Negative for chills and fever.   Eyes: Negative for visual disturbance.   Respiratory: Negative for shortness of breath.    Cardiovascular: Negative for chest pain.   Gastrointestinal: Negative for abdominal pain, nausea and vomiting.   Genitourinary: Positive for vaginal discharge. Negative for vaginal bleeding.   Neurological: Negative for headaches.      Objective:     Vital Signs (Most Recent):  Temp: 98.4 °F (36.9 °C) (12/26/18 0624)  Pulse: 68 (12/26/18 0624)  Resp: 20 (12/26/18 0624)  BP: 114/68 (12/26/18 0624)  SpO2: 98 % (12/26/18 0536) Vital Signs (24h Range):  Temp:  [98.2 °F (36.8 °C)-98.4 °F (36.9 °C)] 98.4 °F (36.9 °C)  Pulse:  [68-87] 68  Resp:  [18-20] 20  SpO2:  [98 %-100 %] 98 %  BP: (109-114)/(63-68) 114/68     Weight: 66.7 kg (147 lb)  Body mass index is 29.69 kg/m².    Fetal heart tones verified    Physical Exam  Vitals reviewed.  Constitutional: She appears well-developed and well-nourished. She is not diaphoretic. No distress.   HENT:   Head: Normocephalic and atraumatic.   Cardiovascular: Normal rate, regular rhythm and normal heart sounds.   Pulmonary/Chest: No respiratory distress.   Abdominal: Soft. There is no tenderness. There is no rebound and no guarding.   Musculoskeletal: She exhibits no edema or tenderness.   Neurological: She is alert and oriented to person, place, and time.   Skin: Skin is warm and dry.   Psychiatric: She has a normal mood and affect. Her behavior is normal. Judgment and thought content normal.      OB LABOR EXAM:   Pre-Term Labor: No.      Method: Sterile speculum exam per MD.   Vaginal Bleeding: none present.      Comments: SSE: Vaginal vault with scant  amount of clear discharge. No lesions or bleeding. Cervix appears closed and without lesions. ROM+ collected. Negative pooling and valsalva. Negative nitrazine. Negative ferning.     Significant Labs:  Lab Results   Component Value Date    GROUPTRH A POS 2018   ROM+ POSITIVE  I have personallly reviewed all pertinent lab results from the last 24 hours.    Assessment/Plan:     33 y.o. female  at 19w4d for:    * Possible Leakage of amniotic fluid    --negative pooling, ferning, nitrazine  --MVP > 2 on ultrasound  --ROM+ positive, and story suspicious for PPROM  --will admit to observation --> repeat spec exam and ultrasound prior to discharge  --will not give steroids/Mg/PCN as infant is previable  --consents for delivery/d&c/blood signed  --type and screen/CBC performed [place] [facility]         Catracho Hicks MD  Obstetrics  [facility]

## 2018-12-26 NOTE — ED TRIAGE NOTES
Pt 19 weeks pregnant and states she has been leaking fluid since yesterday. Pt denies pain or cramping. Pt states fluid has been constant and has not stopped.

## 2018-12-26 NOTE — ED NOTES
Pt c/o rupture of membranes. States that she first noticed leakage of fluid yesterday at 11am. CODY Rodriguez notified.

## 2018-12-26 NOTE — ED NOTES
Patient identifiers for Dyan Ferrara checked and correct.  LOC: Patient is awake, alert, and aware of environment with an appropriate affect. Patient is oriented x 3 and speaking appropriately.  APPEARANCE: Patient resting comfortably and in no acute distress. Patient is clean and well groomed, patient's clothing is properly fastened.  SKIN: The skin is warm and dry. Patient has normal skin turgor and moist mucus membrances. Skin is intact; no bruising or breakdown noted.  MUSKULOSKELETAL: Patient is moving all extremities well, no obvious deformities noted. Pulses intact.   RESPIRATORY: Airway is open and patent. Respirations are spontaneous and non-labored with normal effort and rate.  CARDIAC: Patient has a normal rate and rhythm. No peripheral edema noted. Capillary refill < 3 seconds.  ABDOMEN: No distention noted. Bowel sounds active in all 4 quadrants. Soft and non-tender upon palpation.  NEUROLOGICAL: PERRL. Facial expression is symmetrical. Hand grasps are equal bilaterally. Normal sensation in all extremities when touched with finger.  Allergies reported: Review of patient's allergies indicates:  No Known Allergies

## 2018-12-26 NOTE — ED PROVIDER NOTES
"Source of History:  patient    Chief complaint:  Pregnancy Issue (Patient reports that she is "leaking clear fluid". Patient states that she is 19 weeks pregnant. Denies contractions. 1st pregnancy. Patient's OB is at [facility])      HPI:  Dyan Ferrara is a 33 y.o. female  at 19weeks 4 days here complaining of clear fluid leaking from the vagina for the last 12 hr.  Patient states it began with a few drops, but became progressively heavier throughout the evening.  She woke up with her underwear soaked and fluid running down her legs.  At this point she came emergently here.  She denies any pain, bleeding, dysuria, discomfort of any other kind.  She gets her normal care at [facility].  Her previous 2 pregnancies ended in spontaneous miscarriages in the 1st trimester.    ROS: As per HPI and below:    General: No fever.  No chills.  Eyes: No visual changes.  Head: No headache.    Integument: No rashes or lesions.  Chest: No shortness of breath.  Cardiovascular: No chest pain.  Abdomen: No abdominal pain.  No nausea or vomiting.  Urinary: No abnormal urination.  Neurologic: No focal weakness.  No numbness.  Hematologic: No easy bruising.  Endocrine: No excessive thirst or urination.      Review of patient's allergies indicates:  No Known Allergies    No current facility-administered medications on file prior to encounter.      Current Outpatient Medications on File Prior to Encounter   Medication Sig Dispense Refill    hydrocortisone 1 % cream Apply to affected area 2 times daily 30 g 0    naproxen sodium (ANAPROX) 550 MG tablet Take 1 tablet (550 mg total) by mouth every 12 (twelve) hours as needed (pain). 20 tablet 0    [DISCONTINUED] ketoconazole (NIZORAL) 2 % shampoo APPLY EVERY  mL 1    [DISCONTINUED] metoclopramide HCl (REGLAN) 10 MG tablet Take 1 tablet (10 mg total) by mouth every 6 (six) hours. 30 tablet 0    [DISCONTINUED] mupirocin calcium 2% nasl oint (BACTROBAN) 2 % Oint Apply to wound " area twice daily for 7 days 10 g 0       PMH:  As per HPI and below:  Past Medical History:   Diagnosis Date    Abnormal cervical Pap smear with positive HPV DNA test 2011    pt states negative biopsy    ADD (attention deficit disorder) 4/24/2014    Adderall XR 20mg qd, 5 mg plain in pm, tried Concerta but didn't help, ADD eval on chart 2013    Allergic dermatitis     takes medrol prior to & after getting tatoos bc of hives    Seborrheic dermatitis of scalp     ketoconazole 2% shampoo helps     Past Surgical History:   Procedure Laterality Date    breast augmentation      COLPOSCOPY         Social History     Socioeconomic History    Marital status:      Spouse name: None    Number of children: None    Years of education: None    Highest education level: None   Social Needs    Financial resource strain: None    Food insecurity - worry: None    Food insecurity - inability: None    Transportation needs - medical: None    Transportation needs - non-medical: None   Occupational History    None   Tobacco Use    Smoking status: Never Smoker    Smokeless tobacco: Never Used   Substance and Sexual Activity    Alcohol use: No     Frequency: Never     Comment: socially    Drug use: Yes     Types: Marijuana     Comment: today    Sexual activity: Yes     Partners: Male     Birth control/protection: None   Other Topics Concern    None   Social History Narrative    Accounting asst, , no children, nonsmoker, nondrinker, GYN nml 2013       Family History   Problem Relation Age of Onset    Breast cancer Neg Hx     Cancer Neg Hx     Ovarian cancer Neg Hx        Physical Exam:    Vitals:    12/26/18 0500   BP: 109/64   Pulse:    Resp:    Temp:      Appearance: No acute distress.  Skin: No rashes seen.  Good turgor.  No abrasions.  No ecchymoses.  Eyes: No conjunctival injection.  ENT: Oropharynx clear.    Chest: Clear to auscultation bilaterally.  Good air movement.  No wheezes.  No  rhonchi.  Cardiovascular: Regular rate and rhythm.  No murmurs. No gallops. No rubs.  Abdomen: Soft.  Not distended.  Nontender.  No guarding.  No rebound. No Masses  :  Normal external genitalia, clear/cloudy fluid from os  Musculoskeletal: Good range of motion all joints.  No deformities.  Neck supple.  No meningismus.  Neurologic: Moves all extremities.  Normal sensation.  No facial droop.  Normal speech.    Mental Status:  Alert and oriented x 3.  Appropriate, conversant.    Bedside ultrasound shows fetal heart tones at 140 bmp  Laboratory Studies:  Labs Reviewed   CBC W/ AUTO DIFFERENTIAL   COMPREHENSIVE METABOLIC PANEL   PROTIME-INR   TYPE & SCREEN           Imaging Results    None         Medications Given:  Medications - No data to display    Discussed with:  Dr. Liseth MENDOZA    MDM:    33 y.o. female with clear vaginal discharge/leakage concerning for PPROM.  I discussed the case with OB gyn at [facility] who accepted the transfer for obstetrical evaluation.  Patient remains hemodynamically stable. Labs including type and screen drawn and pending prior to transfer.    Diagnostic Impression:    1. Vaginal discharge               Hans Dillard MD  12/26/18 0531

## 2018-12-27 ENCOUNTER — ANESTHESIA (OUTPATIENT)
Dept: OBSTETRICS AND GYNECOLOGY | Facility: OTHER | Age: 33
End: 2018-12-27
Payer: OTHER GOVERNMENT

## 2018-12-27 PROBLEM — O42.919 PRETERM PREMATURE RUPTURE OF MEMBRANES (PPROM) WITH UNKNOWN ONSET OF LABOR: Status: ACTIVE | Noted: 2018-12-27

## 2018-12-27 LAB
ABO + RH BLD: NORMAL
BLD GP AB SCN CELLS X3 SERPL QL: NORMAL

## 2018-12-27 PROCEDURE — D9220A PRA ANESTHESIA: ICD-10-PCS | Mod: ,,, | Performed by: ANESTHESIOLOGY

## 2018-12-27 PROCEDURE — D9220A PRA ANESTHESIA: Mod: ,,, | Performed by: ANESTHESIOLOGY

## 2018-12-27 PROCEDURE — 25000003 PHARM REV CODE 250: Performed by: STUDENT IN AN ORGANIZED HEALTH CARE EDUCATION/TRAINING PROGRAM

## 2018-12-27 PROCEDURE — 72100002 HC LABOR CARE, 1ST 8 HOURS

## 2018-12-27 PROCEDURE — 99223 1ST HOSP IP/OBS HIGH 75: CPT | Mod: ,,, | Performed by: OBSTETRICS & GYNECOLOGY

## 2018-12-27 PROCEDURE — 86850 RBC ANTIBODY SCREEN: CPT

## 2018-12-27 PROCEDURE — 63600175 PHARM REV CODE 636 W HCPCS: Performed by: UROLOGY

## 2018-12-27 PROCEDURE — 11000001 HC ACUTE MED/SURG PRIVATE ROOM

## 2018-12-27 PROCEDURE — 27200710 HC EPIDURAL INFUSION PUMP SET: Performed by: STUDENT IN AN ORGANIZED HEALTH CARE EDUCATION/TRAINING PROGRAM

## 2018-12-27 PROCEDURE — 36415 COLL VENOUS BLD VENIPUNCTURE: CPT

## 2018-12-27 PROCEDURE — 63600175 PHARM REV CODE 636 W HCPCS: Performed by: STUDENT IN AN ORGANIZED HEALTH CARE EDUCATION/TRAINING PROGRAM

## 2018-12-27 PROCEDURE — 99223 PR INITIAL HOSPITAL CARE,LEVL III: ICD-10-PCS | Mod: ,,, | Performed by: OBSTETRICS & GYNECOLOGY

## 2018-12-27 PROCEDURE — 27800517 HC TRAY,EPIDURAL-CONTINUOUS: Performed by: STUDENT IN AN ORGANIZED HEALTH CARE EDUCATION/TRAINING PROGRAM

## 2018-12-27 RX ORDER — MISOPROSTOL 200 UG/1
800 TABLET ORAL ONCE
Status: COMPLETED | OUTPATIENT
Start: 2018-12-27 | End: 2018-12-27

## 2018-12-27 RX ORDER — HYDROMORPHONE HCL IN 0.9% NACL 6 MG/30 ML
PATIENT CONTROLLED ANALGESIA SYRINGE INTRAVENOUS CONTINUOUS
Status: DISCONTINUED | OUTPATIENT
Start: 2018-12-27 | End: 2018-12-27

## 2018-12-27 RX ORDER — MISOPROSTOL 200 UG/1
600 TABLET ORAL
Status: DISCONTINUED | OUTPATIENT
Start: 2018-12-27 | End: 2018-12-28

## 2018-12-27 RX ORDER — OXYTOCIN/RINGER'S LACTATE 20/1000 ML
333 PLASTIC BAG, INJECTION (ML) INTRAVENOUS CONTINUOUS
Status: ACTIVE | OUTPATIENT
Start: 2018-12-27 | End: 2018-12-27

## 2018-12-27 RX ORDER — BUPIVACAINE HYDROCHLORIDE 2.5 MG/ML
INJECTION, SOLUTION EPIDURAL; INFILTRATION; INTRACAUDAL
Status: DISPENSED
Start: 2018-12-27 | End: 2018-12-28

## 2018-12-27 RX ORDER — METHYLERGONOVINE MALEATE 0.2 MG/ML
200 INJECTION INTRAVENOUS
Status: DISCONTINUED | OUTPATIENT
Start: 2018-12-27 | End: 2018-12-28

## 2018-12-27 RX ORDER — FENTANYL/BUPIVACAINE/NS/PF 2MCG/ML-.1
PLASTIC BAG, INJECTION (ML) INJECTION
Status: DISPENSED
Start: 2018-12-27 | End: 2018-12-28

## 2018-12-27 RX ORDER — OXYTOCIN/RINGER'S LACTATE 20/1000 ML
41.7 PLASTIC BAG, INJECTION (ML) INTRAVENOUS CONTINUOUS
Status: ACTIVE | OUTPATIENT
Start: 2018-12-27 | End: 2018-12-27

## 2018-12-27 RX ORDER — MISOPROSTOL 200 UG/1
400 TABLET ORAL
Status: DISCONTINUED | OUTPATIENT
Start: 2018-12-27 | End: 2018-12-28

## 2018-12-27 RX ORDER — CARBOPROST TROMETHAMINE 250 UG/ML
250 INJECTION, SOLUTION INTRAMUSCULAR
Status: DISCONTINUED | OUTPATIENT
Start: 2018-12-27 | End: 2018-12-28

## 2018-12-27 RX ORDER — NALOXONE HCL 0.4 MG/ML
0.02 VIAL (ML) INJECTION
Status: DISCONTINUED | OUTPATIENT
Start: 2018-12-27 | End: 2018-12-28

## 2018-12-27 RX ORDER — ONDANSETRON 2 MG/ML
4 INJECTION INTRAMUSCULAR; INTRAVENOUS EVERY 12 HOURS PRN
Status: DISCONTINUED | OUTPATIENT
Start: 2018-12-27 | End: 2018-12-28

## 2018-12-27 RX ORDER — HYDROMORPHONE HCL IN 0.9% NACL 6 MG/30 ML
PATIENT CONTROLLED ANALGESIA SYRINGE INTRAVENOUS CONTINUOUS
Status: DISCONTINUED | OUTPATIENT
Start: 2018-12-27 | End: 2018-12-28

## 2018-12-27 RX ORDER — NALBUPHINE HYDROCHLORIDE 10 MG/ML
2.5 INJECTION, SOLUTION INTRAMUSCULAR; INTRAVENOUS; SUBCUTANEOUS ONCE AS NEEDED
Status: DISCONTINUED | OUTPATIENT
Start: 2018-12-27 | End: 2018-12-28

## 2018-12-27 RX ORDER — NALBUPHINE HYDROCHLORIDE 10 MG/ML
2.5 INJECTION, SOLUTION INTRAMUSCULAR; INTRAVENOUS; SUBCUTANEOUS ONCE AS NEEDED
Status: DISCONTINUED | OUTPATIENT
Start: 2018-12-27 | End: 2018-12-27

## 2018-12-27 RX ORDER — SODIUM CHLORIDE, SODIUM LACTATE, POTASSIUM CHLORIDE, CALCIUM CHLORIDE 600; 310; 30; 20 MG/100ML; MG/100ML; MG/100ML; MG/100ML
INJECTION, SOLUTION INTRAVENOUS CONTINUOUS
Status: DISCONTINUED | OUTPATIENT
Start: 2018-12-27 | End: 2018-12-28

## 2018-12-27 RX ADMIN — Medication 10 ML/HR: at 11:12

## 2018-12-27 RX ADMIN — Medication 5 ML: at 11:12

## 2018-12-27 RX ADMIN — MISOPROSTOL 400 MCG: 200 TABLET ORAL at 06:12

## 2018-12-27 RX ADMIN — MISOPROSTOL 800 MCG: 200 TABLET ORAL at 10:12

## 2018-12-27 RX ADMIN — MISOPROSTOL 400 MCG: 200 TABLET ORAL at 02:12

## 2018-12-27 RX ADMIN — SODIUM CHLORIDE, SODIUM LACTATE, POTASSIUM CHLORIDE, AND CALCIUM CHLORIDE: .6; .31; .03; .02 INJECTION, SOLUTION INTRAVENOUS at 10:12

## 2018-12-27 RX ADMIN — Medication: at 05:12

## 2018-12-27 RX ADMIN — LIDOCAINE HYDROCHLORIDE,EPINEPHRINE BITARTRATE 3 ML: 15; .005 INJECTION, SOLUTION EPIDURAL; INFILTRATION; INTRACAUDAL; PERINEURAL at 11:12

## 2018-12-27 RX ADMIN — ONDANSETRON 8 MG: 8 TABLET, ORALLY DISINTEGRATING ORAL at 06:12

## 2018-12-27 RX ADMIN — PROMETHAZINE HYDROCHLORIDE 12.5 MG: 25 INJECTION INTRAMUSCULAR; INTRAVENOUS at 10:12

## 2018-12-27 NOTE — PROGRESS NOTES
[facility]  Obstetrics  Antepartum Progress Note    Patient Name: Dyan Ferrara  MRN: [id number]  Admission Date: 2018  Hospital Length of Stay: 0 days  Attending Physician: Tena Ivy MD  Primary Care Provider: Lacy Silva MD    Subjective:     Principal Problem:Leakage of amniotic fluid    HPI:  Dyan Ferrara is a 33 y.o. [id number] at 19w4d presents as a transfer from [facility] for possible PPROM. Patient states that yesterday at 1100 she noticed a clear discharge that soaked her underwear. She assumed it was urine or discharge and continued to have small amount of clear discharge throughout the day. At 0400 this morning, patient woke up and noticed that her panty liner and underwear were soaked through with clear fluid. When she stood up, clear fluid ran down her leg and she decided to go to the ED. Patient receives all PNC at [facility] with Dr. Beard. She has a history of 3 SABs all before 8 weeks and all this year. She has no other significant PMHx. She denies contractions and vaginal bleeding. She reports fetal movement.        Hospital Course:  2018 Admitted to observation. Rule out ROM.     Obstetric HPI:  Patient reports large gush of fluid earlier this morning around 4 am. Clear. No bleeding. No contractions. No fevers/chills. Normal fetal movement.     Objective:     Vital Signs (Most Recent):  Temp: 96.8 °F (36 °C) (18 0447)  Pulse: 76 (18 0448)  Resp: 18 (18 0448)  BP: (!) 115/56 (18 0448)  SpO2: 97 % (18 1637) Vital Signs (24h Range):  Temp:  [96.8 °F (36 °C)-98.2 °F (36.8 °C)] 96.8 °F (36 °C)  Pulse:  [74-85] 76  Resp:  [16-18] 18  SpO2:  [97 %-99 %] 97 %  BP: ()/(55-74) 115/56     Weight: 66.7 kg (147 lb 0.8 oz)  Body mass index is 29.7 kg/m².    No intake or output data in the 24 hours ending 18 0634    Cervical Exam: Visually closed on      Significant Labs:  Recent Lab Results       18  0782         Rupture of Membrane Positive           Physical Exam:   Constitutional: She is oriented to person, place, and time. She appears well-developed and well-nourished. No distress.    HENT:   Head: Normocephalic and atraumatic.       Pulmonary/Chest: Effort normal. No respiratory distress.        Abdominal: Soft. She exhibits no distension. There is no tenderness. There is no rebound and no guarding.   Gravid, no fundal TTP             Musculoskeletal: Moves all extremeties.       Neurological: She is alert and oriented to person, place, and time.    Skin: Skin is warm and dry. She is not diaphoretic.    Psychiatric: She has a normal mood and affect. Her behavior is normal. Judgment and thought content normal.       Assessment/Plan:     33 y.o. female  at 19w5d for:    * Possible Leakage of amniotic fluid    --negative pooling, ferning, nitrazine  --MVP > 2 on ultrasound  --ROM+ positive, and story suspicious for PPROM  --observation overnight. Will repeat ultrasound/spec exam today to clarify diagnosis. Pending results, will  patient and develop plan for management.  --will not give steroids/Mg/PCN as infant is previable  --consents for delivery/d&c/blood signed  --type and screen/CBC performed at [facility]       DISPO: plan to discharge home today following repeat ultrasound/spec exam    Catracho Hicks MD  Obstetrics  [facility]

## 2018-12-27 NOTE — NURSING
Patient and significan other have decided, after discussion c , that she will have an induction;  notified

## 2018-12-27 NOTE — ASSESSMENT & PLAN NOTE
--negative pooling, ferning, nitrazine  --MVP > 2 on ultrasound  --ROM+ positive, and story suspicious for PPROM  --observation overnight. Will repeat ultrasound/spec exam today to clarify diagnosis. Pending results, will  patient and develop plan for management.  --will not give steroids/Mg/PCN as infant is previable  --consents for delivery/d&c/blood signed  --[place] [facility] [place]

## 2018-12-27 NOTE — PROGRESS NOTES
"LABOR NOTE    S: Pt reporting abdominal cramping. Does not desire pain medication at this time. Small amount of spotting.    O: BP (!) 121/59   Pulse 79   Temp 98.2 °F (36.8 °C) (Oral)   Resp 16   Ht 4' 11" (1.499 m)   Wt 66.7 kg (147 lb 0.8 oz)   LMP 2018   SpO2 98%   Breastfeeding? No   BMI 29.70 kg/m²     ASSESSMENT:   33 y.o.  at 19w5d, IOL 2/2 PPROM @ 19w4d      Active Hospital Problems    Diagnosis  POA    *Possible Leakage of amniotic fluid [O42.90]  Unknown     premature rupture of membranes (PPROM) with unknown onset of labor [O42.919]  Yes    Vaginal discharge [N89.8]  Yes      Resolved Hospital Problems   No resolved problems to display.     TIMELINE:  1045: vaginal cytotec 800, cervix closed  1445: vaginal cytotec 400, cervix closed    PLAN:  --400 mcg vaginal cytotec q4h until delivery  --cervical checks PRN  --pain mgmt per anesthesia    Catracho Hicks MD  PGY2, OBGYN [facility]    "

## 2018-12-27 NOTE — SUBJECTIVE & OBJECTIVE
Obstetric HPI:  Patient reports large gush of fluid earlier this morning around 4 am. Clear. No bleeding. No contractions. No fevers/chills. Normal fetal movement.     Objective:     Vital Signs (Most Recent):  Temp: 96.8 °F (36 °C) (12/27/18 0447)  Pulse: 76 (12/27/18 0448)  Resp: 18 (12/27/18 0448)  BP: (!) 115/56 (12/27/18 0448)  SpO2: 97 % (12/26/18 1637) Vital Signs (24h Range):  Temp:  [96.8 °F (36 °C)-98.2 °F (36.8 °C)] 96.8 °F (36 °C)  Pulse:  [74-85] 76  Resp:  [16-18] 18  SpO2:  [97 %-99 %] 97 %  BP: ()/(55-74) 115/56     Weight: 66.7 kg (147 lb 0.8 oz)  Body mass index is 29.7 kg/m².    No intake or output data in the 24 hours ending 12/27/18 0634    Cervical Exam: Visually closed on 12/26     Significant Labs:  Recent Lab Results       12/26/18  0708        Rupture of Membrane Positive           Physical Exam:   Constitutional: She is oriented to person, place, and time. She appears well-developed and well-nourished. No distress.    HENT:   Head: Normocephalic and atraumatic.       Pulmonary/Chest: Effort normal. No respiratory distress.        Abdominal: Soft. She exhibits no distension. There is no tenderness. There is no rebound and no guarding.   Gravid, no fundal TTP             Musculoskeletal: Moves all extremeties.       Neurological: She is alert and oriented to person, place, and time.    Skin: Skin is warm and dry. She is not diaphoretic.    Psychiatric: She has a normal mood and affect. Her behavior is normal. Judgment and thought content normal.

## 2018-12-27 NOTE — ANESTHESIA PREPROCEDURE EVALUATION
"                                                           Dyan Ferrara is a 33 y.o. female 33 y.o. [id number] at 19w5d presented 18 with previable premature rupture of membranes at home. US confirmed oligohydramnios. Patient already has had 3 SAB this calendar year prior to 8 weeks of gestation.    Patient to undergo IOL due to risks of maternal morbidity with outpatient expectant management.     No food or drug allergies  Social: Marijuana use    No neuraxial anesthetics  Prior GETA for breast surgery - no complications    Not interested in any anesthetic adjunct at this time - "I want to do it without anything. I don't want anything that will make me not remember this"    Discussed Dilaudid PCEA, Remifentanyl PCEA, Inhaled NO2 and lastly, neuraxial anesthetics.    OB History    Para Term  AB Living   4       3     SAB TAB Ectopic Multiple Live Births   2              # Outcome Date GA Lbr Odilon/2nd Weight Sex Delivery Anes PTL Lv   4 Current            3 SAB            2 SAB            1 AB                   Wt Readings from Last 1 Encounters:   18 66.7 kg (147 lb 0.8 oz)   18 66.7 kg (147 lb)       BP Readings from Last 3 Encounters:   18 (!) 99/57   10/02/18 (!) 103/54   18 132/77       Patient Active Problem List   Diagnosis    ADD (attention deficit disorder)    Seborrheic dermatitis of scalp    Allergic dermatitis    Possible Leakage of amniotic fluid    Vaginal discharge     premature rupture of membranes (PPROM) with unknown onset of labor       Past Surgical History:   Procedure Laterality Date    breast augmentation      COLPOSCOPY         Social History     Socioeconomic History    Marital status:      Spouse name: Not on file    Number of children: Not on file    Years of education: Not on file    Highest education level: Not on file   Social Needs    Financial resource strain: Not on file    Food insecurity - worry: Not " on file    Food insecurity - inability: Not on file    Transportation needs - medical: Not on file    Transportation needs - non-medical: Not on file   Occupational History    Not on file   Tobacco Use    Smoking status: Never Smoker    Smokeless tobacco: Never Used   Substance and Sexual Activity    Alcohol use: No     Frequency: Never     Comment: socially    Drug use: Yes     Types: Marijuana     Comment: today    Sexual activity: Yes     Partners: Male     Birth control/protection: None   Other Topics Concern    Not on file   Social History Narrative    Accounting asst, , no children, nonsmoker, nondrinker, GYN nml 2013         Chemistry        Component Value Date/Time     12/26/2018 0520    K 3.6 12/26/2018 0520     12/26/2018 0520    CO2 19 (L) 12/26/2018 0520    BUN 4 (L) 12/26/2018 0520    CREATININE 0.6 12/26/2018 0520    GLU 92 12/26/2018 0520        Component Value Date/Time    CALCIUM 8.8 12/26/2018 0520    ALKPHOS 70 12/26/2018 0520    AST 11 12/26/2018 0520    ALT 7 (L) 12/26/2018 0520    BILITOT 0.3 12/26/2018 0520    ESTGFRAFRICA >60.0 12/26/2018 0520    EGFRNONAA >60.0 12/26/2018 0520            Lab Results   Component Value Date    WBC 13.26 (H) 12/26/2018    HGB 11.0 (L) 12/26/2018    HCT 31.4 (L) 12/26/2018    MCV 90 12/26/2018     12/26/2018       Recent Labs     12/26/18  0520   INR 1.0         Anesthesia Evaluation    I have reviewed the Patient Summary Reports.    I have reviewed the Nursing Notes.   I have reviewed the Medications.     Review of Systems  Anesthesia Hx:  No problems with previous Anesthesia Denies Hx of Anesthetic complications  History of prior surgery of interest to airway management or planning: Previous anesthesia: General Denies Family Hx of Anesthesia complications.   Denies Personal Hx of Anesthesia complications.   Social:  Smoker        Physical Exam  General:  Well nourished    Airway/Jaw/Neck:  Airway Findings: Mouth Opening:  Normal Tongue: Normal  Pre-Existing Airway Tube(s): Oral Endotracheal tube  General Airway Assessment: Adult  Mallampati: I  Improves to I with phonation.  TM Distance: Normal, at least 6 cm      Dental:  Dental Findings: In tact        Mental Status:  Mental Status Findings:  Cooperative, Alert and Oriented         Anesthesia Plan  Type of Anesthesia, risks & benefits discussed:  Anesthesia Type:  CSE, epidural, general, spinal, MAC  Patient's Preference:   Intra-op Monitoring Plan: standard ASA monitors  Intra-op Monitoring Plan Comments:   Post Op Pain Control Plan: per primary service following discharge from PACU, IV/PO Opioids PRN and multimodal analgesia  Post Op Pain Control Plan Comments:   Induction:   IV  Beta Blocker:  Patient is not currently on a Beta-Blocker (No further documentation required).       Informed Consent: Patient understands risks and agrees with Anesthesia plan.  Questions answered. Anesthesia consent signed with patient.  ASA Score: 2     Day of Surgery Review of History & Physical:            Ready For Surgery From Anesthesia Perspective.

## 2018-12-27 NOTE — PROGRESS NOTES
After counseling and discussion with family, patient decided to proceed with induction of labor for previable PPROM. Induction process with vaginal cytotec discussed with patient and family, including expectations and potential need for D&C (consents for delivery, blood, and D&C signed on admission). All questions answered.    - transfer to L&D  - anesthesia consult  - cytotec 800 mcg PV now, then 400 mcg q4 until delivery    D/w [facility] staff    Radha Gastelum MD  OBGYN - PGY 4

## 2018-12-28 VITALS
WEIGHT: 147.06 LBS | HEART RATE: 76 BPM | HEIGHT: 59 IN | RESPIRATION RATE: 16 BRPM | DIASTOLIC BLOOD PRESSURE: 50 MMHG | TEMPERATURE: 98 F | SYSTOLIC BLOOD PRESSURE: 87 MMHG | BODY MASS INDEX: 29.65 KG/M2 | OXYGEN SATURATION: 97 %

## 2018-12-28 LAB
BASOPHILS # BLD AUTO: 0.02 K/UL
BASOPHILS NFR BLD: 0.1 %
DIFFERENTIAL METHOD: ABNORMAL
EOSINOPHIL # BLD AUTO: 0.1 K/UL
EOSINOPHIL NFR BLD: 0.4 %
ERYTHROCYTE [DISTWIDTH] IN BLOOD BY AUTOMATED COUNT: 13.5 %
HCT VFR BLD AUTO: 31.1 %
HGB BLD-MCNC: 10.9 G/DL
LYMPHOCYTES # BLD AUTO: 2 K/UL
LYMPHOCYTES NFR BLD: 12.5 %
MCH RBC QN AUTO: 30.9 PG
MCHC RBC AUTO-ENTMCNC: 35 G/DL
MCV RBC AUTO: 88 FL
MONOCYTES # BLD AUTO: 1.2 K/UL
MONOCYTES NFR BLD: 7.3 %
NEUTROPHILS # BLD AUTO: 12.9 K/UL
NEUTROPHILS NFR BLD: 79.3 %
PLATELET # BLD AUTO: 175 K/UL
PMV BLD AUTO: 10 FL
RBC # BLD AUTO: 3.53 M/UL
WBC # BLD AUTO: 16.28 K/UL

## 2018-12-28 PROCEDURE — 25000003 PHARM REV CODE 250: Performed by: STUDENT IN AN ORGANIZED HEALTH CARE EDUCATION/TRAINING PROGRAM

## 2018-12-28 PROCEDURE — 59855 PR INDUCED AB BY VAG SUPPOS: ICD-10-PCS | Mod: ,,, | Performed by: OBSTETRICS & GYNECOLOGY

## 2018-12-28 PROCEDURE — 36415 COLL VENOUS BLD VENIPUNCTURE: CPT

## 2018-12-28 PROCEDURE — 99238 HOSP IP/OBS DSCHRG MGMT 30/<: CPT | Mod: ,,, | Performed by: OBSTETRICS & GYNECOLOGY

## 2018-12-28 PROCEDURE — 99238 PR HOSPITAL DISCHARGE DAY,<30 MIN: ICD-10-PCS | Mod: ,,, | Performed by: OBSTETRICS & GYNECOLOGY

## 2018-12-28 PROCEDURE — 88305 TISSUE EXAM BY PATHOLOGIST: CPT | Performed by: PATHOLOGY

## 2018-12-28 PROCEDURE — 51702 INSERT TEMP BLADDER CATH: CPT

## 2018-12-28 PROCEDURE — 62326 NJX INTERLAMINAR LMBR/SAC: CPT | Performed by: STUDENT IN AN ORGANIZED HEALTH CARE EDUCATION/TRAINING PROGRAM

## 2018-12-28 PROCEDURE — 72200004 HC VAGINAL DELIVERY LEVEL I

## 2018-12-28 PROCEDURE — 59855 INDUCED ABORTION 1+VAG SUPP: CPT | Mod: ,,, | Performed by: OBSTETRICS & GYNECOLOGY

## 2018-12-28 PROCEDURE — 85025 COMPLETE CBC W/AUTO DIFF WBC: CPT

## 2018-12-28 RX ORDER — FENTANYL/BUPIVACAINE/NS/PF 2MCG/ML-.1
PLASTIC BAG, INJECTION (ML) INJECTION CONTINUOUS PRN
Status: DISCONTINUED | OUTPATIENT
Start: 2018-12-27 | End: 2018-12-28

## 2018-12-28 RX ORDER — FAMOTIDINE 10 MG/ML
20 INJECTION INTRAVENOUS ONCE
Status: DISCONTINUED | OUTPATIENT
Start: 2018-12-28 | End: 2018-12-28

## 2018-12-28 RX ORDER — OXYTOCIN/RINGER'S LACTATE 20/1000 ML
41.65 PLASTIC BAG, INJECTION (ML) INTRAVENOUS CONTINUOUS
Status: DISCONTINUED | OUTPATIENT
Start: 2018-12-28 | End: 2018-12-28

## 2018-12-28 RX ORDER — DOCUSATE SODIUM 100 MG/1
200 CAPSULE, LIQUID FILLED ORAL 2 TIMES DAILY PRN
Refills: 0 | COMMUNITY
Start: 2018-12-28 | End: 2019-01-18

## 2018-12-28 RX ORDER — SODIUM CITRATE AND CITRIC ACID MONOHYDRATE 334; 500 MG/5ML; MG/5ML
30 SOLUTION ORAL ONCE
Status: DISCONTINUED | OUTPATIENT
Start: 2018-12-28 | End: 2018-12-28

## 2018-12-28 RX ORDER — DOCUSATE SODIUM 100 MG/1
200 CAPSULE, LIQUID FILLED ORAL 2 TIMES DAILY PRN
Status: DISCONTINUED | OUTPATIENT
Start: 2018-12-28 | End: 2018-12-28 | Stop reason: HOSPADM

## 2018-12-28 RX ORDER — LIDOCAINE HYDROCHLORIDE AND EPINEPHRINE 15; 5 MG/ML; UG/ML
INJECTION, SOLUTION EPIDURAL
Status: DISCONTINUED | OUTPATIENT
Start: 2018-12-27 | End: 2018-12-28

## 2018-12-28 RX ORDER — HYDROCODONE BITARTRATE AND ACETAMINOPHEN 5; 325 MG/1; MG/1
1 TABLET ORAL EVERY 4 HOURS PRN
Status: DISCONTINUED | OUTPATIENT
Start: 2018-12-28 | End: 2018-12-28 | Stop reason: HOSPADM

## 2018-12-28 RX ORDER — HYDROCODONE BITARTRATE AND ACETAMINOPHEN 10; 325 MG/1; MG/1
1 TABLET ORAL EVERY 4 HOURS PRN
Status: DISCONTINUED | OUTPATIENT
Start: 2018-12-28 | End: 2018-12-28 | Stop reason: HOSPADM

## 2018-12-28 RX ORDER — OXYTOCIN/RINGER'S LACTATE 20/1000 ML
PLASTIC BAG, INJECTION (ML) INTRAVENOUS
Status: DISPENSED
Start: 2018-12-28 | End: 2018-12-28

## 2018-12-28 RX ORDER — IBUPROFEN 600 MG/1
600 TABLET ORAL EVERY 6 HOURS
Qty: 30 TABLET | Refills: 0 | Status: SHIPPED | OUTPATIENT
Start: 2018-12-28 | End: 2019-04-01

## 2018-12-28 RX ORDER — ONDANSETRON 8 MG/1
8 TABLET, ORALLY DISINTEGRATING ORAL EVERY 8 HOURS PRN
Status: DISCONTINUED | OUTPATIENT
Start: 2018-12-28 | End: 2018-12-28 | Stop reason: HOSPADM

## 2018-12-28 RX ORDER — FENTANYL/BUPIVACAINE/NS/PF 2MCG/ML-.1
PLASTIC BAG, INJECTION (ML) INJECTION CONTINUOUS
Status: DISCONTINUED | OUTPATIENT
Start: 2018-12-28 | End: 2018-12-28

## 2018-12-28 RX ORDER — HYDROCODONE BITARTRATE AND ACETAMINOPHEN 5; 325 MG/1; MG/1
1 TABLET ORAL EVERY 4 HOURS PRN
Qty: 10 TABLET | Refills: 0 | Status: SHIPPED | OUTPATIENT
Start: 2018-12-28 | End: 2019-01-18

## 2018-12-28 RX ORDER — DIPHENHYDRAMINE HYDROCHLORIDE 50 MG/ML
25 INJECTION INTRAMUSCULAR; INTRAVENOUS EVERY 4 HOURS PRN
Status: DISCONTINUED | OUTPATIENT
Start: 2018-12-28 | End: 2018-12-28 | Stop reason: HOSPADM

## 2018-12-28 RX ORDER — IBUPROFEN 600 MG/1
600 TABLET ORAL EVERY 6 HOURS
Status: DISCONTINUED | OUTPATIENT
Start: 2018-12-28 | End: 2018-12-28 | Stop reason: HOSPADM

## 2018-12-28 RX ORDER — DIPHENHYDRAMINE HCL 25 MG
25 CAPSULE ORAL EVERY 4 HOURS PRN
Status: DISCONTINUED | OUTPATIENT
Start: 2018-12-28 | End: 2018-12-28 | Stop reason: HOSPADM

## 2018-12-28 RX ORDER — HYDROCORTISONE 25 MG/G
CREAM TOPICAL 3 TIMES DAILY PRN
Status: DISCONTINUED | OUTPATIENT
Start: 2018-12-28 | End: 2018-12-28 | Stop reason: HOSPADM

## 2018-12-28 RX ADMIN — IBUPROFEN 600 MG: 600 TABLET ORAL at 05:12

## 2018-12-28 RX ADMIN — IBUPROFEN 600 MG: 600 TABLET ORAL at 12:12

## 2018-12-28 RX ADMIN — MISOPROSTOL 400 MCG: 200 TABLET ORAL at 12:12

## 2018-12-28 NOTE — L&D DELIVERY NOTE
[facility]  Vaginal Delivery   Operative Note    SUMMARY     Patient with PPROM of nonviable fetus; opted for induction of labor.   Labor induced with vaginal misoprostol.  Fetus in breech presentation.   Patient examined and found to have fetal legs and torso in the vagina.   With maternal effort, fetal head delivered.   Placenta delivered with traction on the umbilical cord.   Patient was under epidural anesthesia.  Fetus was not alive at the time of delivery.   Uterine tone noted again.   Patient and infant tolerated delivery well.  EBL 50 mL.    Jatin Carvajal M.D.  Obstetrics & Gynecology  PGY-2    Indications: Leakage of amniotic fluid  Pregnancy complicated by:   Patient Active Problem List   Diagnosis    ADD (attention deficit disorder)    Seborrheic dermatitis of scalp    Allergic dermatitis    Possible Leakage of amniotic fluid    Vaginal discharge     premature rupture of membranes (PPROM) with unknown onset of labor     Admitting GA: 19w6d    Delivery Information for  Homar Ferrara    Birth information:  YOB: 2017   Time of birth: 2:17 AM   Sex: male   Head Delivery Date/Time: 2018  2:17 AM   Delivery type: Vaginal, Spontaneous   Gestational Age: 19w6d    Delivery Providers    Delivering clinician:  India Hayden MD   Provider Role    Jatin Carvajal MD Resident    Rosanna Powell, RN Delivery Nurse            Measurements    Weight:  325 g  Length:           Apgars    Living status:  Fetal Demise  Apgars:   1 min.:   5 min.:   10 min.:   15 min.:   20 min.:     Skin color:   0  0  0  0  0    Heart rate:   0  0  0  0  0    Reflex irritability:   0  0  0  0  0    Muscle tone:   0  0  0  0  0    Respiratory effort:   0  0  0  0  0    Total:   0  0  0  0  0           Operative Delivery    Forceps attempted?:  No  Vacuum extractor attempted?:  No         Shoulder Dystocia    Shoulder dystocia present?:  No           Presentation     Presentation:  Footling Breech           Interventions/Resuscitation    Method:  None       Cord    Vessels:  3 vessels  Complications:  None  Delayed Cord Clamping?:  No  Cord Clamped Date/Time:  2018  2:17 AM  Gases Sent?:  No  Stem Cell Collection (by MD):  No       Placenta    Placenta delivery date/time:  2018 0219  Placenta removal:  Spontaneous  Placenta appearance:  Intact  Placenta disposition:  pathology           Labor Events:       labor: Yes     Labor Onset Date/Time:         Dilation Complete Date/Time: 2018 02:10     Start Pushing Date/Time: 2018 02:10     Rupture Date/Time: 18  1100         Rupture type:           Fluid Amount:        Fluid Color:        Fluid Odor:        Membrane Status (PeriCalm):        Rupture Date/Time (PeriCalm):        Fluid Amount (PeriCalm):        Fluid Color (PeriCalm):         steroids: None     Antibiotics given for GBS: No     Induction: misoprostol     Indications for induction:  Premature ROM     Augmentation:       Indications for augmentation:       Labor complications: None     Additional complications:          Cervical ripening:                     Delivery:      Episiotomy: None     Indication for Episiotomy:       Perineal Lacerations: None Repaired:      Periurethral Laceration: none Repaired:     Labial Laceration: none Repaired:     Sulcus Laceration: none Repaired:     Vaginal Laceration: No Repaired:     Cervical Laceration: No Repaired:     Repair suture: None     Repair # of packets:       Vaginal delivery QBL (mL): 125      QBL (mL): 0     Combined Blood Loss (mL): 125     Vaginal Sweep Performed: Yes     Surgicount Correct: Yes       Other providers:       Anesthesia    Method:  Epidural          Details (if applicable):  Trial of Labor      Categorization:      Priority:     Indications for :     Incision Type:       Additional  information:  Forceps:     Vacuum:    Breech:    Observed anomalies    Other (Comments):

## 2018-12-28 NOTE — PROGRESS NOTES
LABOR NOTE    S: Worsening abdominal cramping as well as low back pain. Now with PCA pump with improvement in pain.     O:   Temp:  [96.6 °F (35.9 °C)-98.6 °F (37 °C)] 98.6 °F (37 °C)  Pulse:  [] 73  Resp:  [16-18] 18  SpO2:  [96 %-100 %] 98 %  BP: ()/(55-68) 125/66    Cervix closed, 50% effaced.    ASSESSMENT:   33 y.o.  at 19w5d, IOL 2/2 PPROM @ 19w4d.    Active Hospital Problems    Diagnosis  POA    *Possible Leakage of amniotic fluid [O42.90]  Unknown     premature rupture of membranes (PPROM) with unknown onset of labor [O42.919]  Yes    Vaginal discharge [N89.8]  Yes      Resolved Hospital Problems   No resolved problems to display.     TIMELINE:  1045: vaginal cytotec 800, cervix closed  1445: vaginal cytotec 400, cervix closed  1900: vaginal cytotec 400, cervix closed/50/-3    PLAN:  - 400 mcg vaginal cytotec q4h until delivery  - cervical checks PRN  - pain mgmt with PCA for per anesthesia    Jatin Carvajal M.D.  Obstetrics & Gynecology  PGY-2

## 2018-12-28 NOTE — DISCHARGE SUMMARY
Delivery Discharge Summary  Obstetrics      Primary OB Clinician: Tena Ivy MD      Admission date: 2018  Discharge date: 2018    Disposition: To home, self care    Discharge Diagnosis List:      Patient Active Problem List   Diagnosis    ADD (attention deficit disorder)    Seborrheic dermatitis of scalp    Allergic dermatitis    Possible Leakage of amniotic fluid    Vaginal discharge     premature rupture of membranes (PPROM) with unknown onset of labor     (spontaneous vaginal delivery)       Procedure:     Hospital Course:  Dyan Ferrara is a 33 y.o. now , PPD #1 who was admitted on 2018 at 19w4d for  premature rupture of membranes. Patient was admitted to confirm rupture of membranes with PPROM confirmed. Options discussed with patient, who elected to undergo induction of labor. Patient was subsequently admitted to labor and delivery unit with signed consents.     Induction with 3 doses of PV cytotec. Labor course was uncomplicated and resulted in  of stillborn fetus without complications.     Please see delivery note for further details. Her postpartum course was uncomplicated. On discharge day, patient's pain is controlled with oral pain medications. Pt is tolerating ambulation without SOB or CP, and regular diet without N/V. Reports lochia is mild. Denies any HA, vision changes, F/C, LE swelling. Denies any breast pain/soreness.    Pt in stable condition and ready for discharge. She has been instructed to start and/or continue medications and follow up with her obstetrics provider as listed below.    Pertinent studies:  CBC  Recent Labs   Lab 18  0520 18  1029   WBC 13.26* 16.28*   HGB 11.0* 10.9*   HCT 31.4* 31.1*   MCV 90 88    175          Immunization History   Administered Date(s) Administered    Tdap 2018        Delivery:    Episiotomy: None   Lacerations: None   Repair suture: None   Repair # of packets:      Blood loss (ml): 125     Birth information:  YOB: 2017   Time of birth: 2:17 AM   Sex: male   Delivery type: Vaginal, Spontaneous   Gestational Age: 19w6d    Delivery Clinician:      Other providers:       Additional  information:  Forceps:    Vacuum:    Breech:    Observed anomalies      Living?:           APGARS  One minute Five minutes Ten minutes   Skin color:         Heart rate:         Grimace:         Muscle tone:         Breathing:         Totals: 0  0  0      Placenta: Delivered:       appearance      Patient Instructions:   Current Discharge Medication List      START taking these medications    Details   docusate sodium (COLACE) 100 MG capsule Take 2 capsules (200 mg total) by mouth 2 (two) times daily as needed for Constipation.  Refills: 0      HYDROcodone-acetaminophen (NORCO) 5-325 mg per tablet Take 1 tablet by mouth every 4 (four) hours as needed.  Qty: 10 tablet, Refills: 0      ibuprofen (ADVIL,MOTRIN) 600 MG tablet Take 1 tablet (600 mg total) by mouth every 6 (six) hours.  Qty: 30 tablet, Refills: 0         CONTINUE these medications which have NOT CHANGED    Details   hydrocortisone 1 % cream Apply to affected area 2 times daily  Qty: 30 g, Refills: 0      naproxen sodium (ANAPROX) 550 MG tablet Take 1 tablet (550 mg total) by mouth every 12 (twelve) hours as needed (pain).  Qty: 20 tablet, Refills: 0         STOP taking these medications       ketoconazole (NIZORAL) 2 % shampoo Comments:   Reason for Stopping:         metoclopramide HCl (REGLAN) 10 MG tablet Comments:   Reason for Stopping:         mupirocin calcium 2% nasl oint (BACTROBAN) 2 % Oint Comments:   Reason for Stopping:               Discharge Procedure Orders   Diet Adult Regular     Other restrictions (specify):   Order Comments: Pelvic rest for at least 6 weeks. Nothing in vagina - no sex, tampons, or douching for 6 weeks     Notify your health care provider if you experience any of the following:   Order  Comments: Heavy vaginal bleeding saturating more than 1 pad per hour for at least 2 hours.     Notify your health care provider if you experience any of the following:  increased confusion or weakness     Notify your health care provider if you experience any of the following:  persistent dizziness, light-headedness, or visual disturbances     Notify your health care provider if you experience any of the following:  worsening rash     Notify your health care provider if you experience any of the following:  severe persistent headache     Notify your health care provider if you experience any of the following:  difficulty breathing or increased cough     Notify your health care provider if you experience any of the following:  redness, tenderness, or signs of infection (pain, swelling, redness, odor or green/yellow discharge around incision site)     Notify your health care provider if you experience any of the following:  severe uncontrolled pain     Notify your health care provider if you experience any of the following:  persistent nausea and vomiting or diarrhea     Notify your health care provider if you experience any of the following:  temperature >100.4     Activity as tolerated            Carlee Peralta MD  OBGYN PGY-1

## 2018-12-28 NOTE — ASSESSMENT & PLAN NOTE
--after IOL at 19w5d  --recovering well after delivery. Desires discharge home later today  --grieving appropriately. Awaiting social work and  consults.

## 2018-12-28 NOTE — PROGRESS NOTES
[facility]  Obstetrics  Progress Note    Patient Name: Dyan Ferrara  MRN: [id number]  Admission Date: 2018  Hospital Length of Stay: 1 days  Attending Physician: Tena Ivy MD  Primary Care Provider: Lacy Silva MD    Subjective:     Principal Problem:Leakage of amniotic fluid    HPI:  Dyan Ferrara is a 33 y.o. [id number] at 19w4d presents as a transfer from [facility] for possible PPROM. Patient states that yesterday at 1100 she noticed a clear discharge that soaked her underwear. She assumed it was urine or discharge and continued to have small amount of clear discharge throughout the day. At 0400 this morning, patient woke up and noticed that her panty liner and underwear were soaked through with clear fluid. When she stood up, clear fluid ran down her leg and she decided to go to the ED. Patient receives all PNC at [facility] with Dr. Beard. She has a history of 3 SABs all before 8 weeks and all this year. She has no other significant PMHx. She denies contractions and vaginal bleeding. She reports fetal movement.        Hospital Course:  2018 Admitted to observation. Rule out ROM.   18 ROM confirmed. Pt opted for induction of labor which proceeded without complication.   2018 Pt doing well post delivery. Grieving appropriately, stable for discharge.    Obstetric HPI:  Pt doing relatively well this morning. Abdominal cramping mild. Minimal vaginal bleeding. No nausea/vomiting. Has not yet eaten anything this morning. Grieving appropriately.    Objective:     Vital Signs (Most Recent):  Temp: 97.9 °F (36.6 °C) (18 0520)  Pulse: 80 (18 0521)  Resp: 18 (18 0036)  BP: (!) 109/53 (18 0520)  SpO2: 97 % (18 0520) Vital Signs (24h Range):  Temp:  [96.6 °F (35.9 °C)-98.6 °F (37 °C)] 97.9 °F (36.6 °C)  Pulse:  [] 80  Resp:  [11-18] 18  SpO2:  [87 %-100 %] 97 %  BP: ()/(51-73) 109/53     Weight: 66.7 kg (147 lb 0.8 oz)  Body mass  index is 29.7 kg/m².      Intake/Output Summary (Last 24 hours) at 2018 0636  Last data filed at 2018 0217  Gross per 24 hour   Intake --   Output 250 ml   Net -250 ml       Cervical Exam: Visually closed on      Significant Labs:  Recent Lab Results       18  1034        Group & Rh A POS     INDIRECT JAIRON NEG           Physical Exam:   Constitutional: She is oriented to person, place, and time. She appears well-developed and well-nourished. No distress.    HENT:   Head: Normocephalic and atraumatic.       Pulmonary/Chest: Effort normal. No respiratory distress.        Abdominal: Soft. She exhibits no distension. There is no tenderness. There is no rebound and no guarding.             Musculoskeletal: Moves all extremeties.       Neurological: She is alert and oriented to person, place, and time.    Skin: Skin is warm and dry. She is not diaphoretic.    Psychiatric: She has a normal mood and affect. Her behavior is normal. Judgment and thought content normal.       Assessment/Plan:     33 y.o. female  at 19w6d for:    * Possible Leakage of amniotic fluid    --confirmed on repeat exam      (spontaneous vaginal delivery)    --after IOL at 19w5d  --recovering well after delivery. Desires discharge home later today  --grieving appropriately. Awaiting social work and  consults.       DISPO: will reassess patient this afternoon, discharge home if doing well     Catracho Hicks MD  Obstetrics  [facility]

## 2018-12-28 NOTE — ANESTHESIA PROCEDURE NOTES
Epidural    Patient location during procedure: OB   Reason for block: primary anesthetic   Diagnosis: IUFD   Start time: 12/28/2018 11:00 PM  Timeout: 12/28/2018 11:00 PM  End time: 12/28/2018 11:10 PM  Surgery related to: Vaginal Delivery  Staffing  Anesthesiologist: India Natarajan MD  Resident/CRNA: Carlito Esparza MD  Performed: resident/CRNA   Preanesthetic Checklist  Completed: patient identified, site marked, surgical consent, pre-op evaluation, timeout performed, IV checked, risks and benefits discussed, monitors and equipment checked, anesthesia consent given, hand hygiene performed and patient being monitored  Preparation  Patient position: sitting  Prep: ChloraPrep  Patient monitoring: Pulse Ox  Epidural  Skin Anesthetic: lidocaine 1%  Skin Wheal: 3 mL  Administration type: continuous  Approach: midline  Interspace: L3-4  Injection technique: TIANNA saline  Needle and Epidural Catheter  Needle type: Tuohy   Needle gauge: 17  Needle length: 3.5 inches  Needle insertion depth: 5 cm  Catheter type: springwound  Catheter size: 19 G  Catheter at skin depth: 10 cm  Test dose: 3 mL of lidocaine 1.5% with Epi 1-to-200,000  Additional Documentation: incremental injection, negative aspiration for heme and CSF, no paresthesia on injection, no signs/symptoms of IV or SA injection, no significant pain on injection and no significant complaints from patient  Needle localization: anatomical landmarks  Medications:  Volume per aspiration: 5 mL  Time between aspirations: 5 minutes  Assessment  Ease of block: easy  Patient's tolerance of the procedure: comfortable throughout block and no complaints  Post dural Puncture Headache?: No

## 2018-12-28 NOTE — SUBJECTIVE & OBJECTIVE
Obstetric HPI:  Pt doing relatively well this morning. Abdominal cramping mild. Minimal vaginal bleeding. No nausea/vomiting. Has not yet eaten anything this morning. Grieving appropriately.    Objective:     Vital Signs (Most Recent):  Temp: 97.9 °F (36.6 °C) (12/28/18 0520)  Pulse: 80 (12/28/18 0521)  Resp: 18 (12/28/18 0036)  BP: (!) 109/53 (12/28/18 0520)  SpO2: 97 % (12/28/18 0520) Vital Signs (24h Range):  Temp:  [96.6 °F (35.9 °C)-98.6 °F (37 °C)] 97.9 °F (36.6 °C)  Pulse:  [] 80  Resp:  [11-18] 18  SpO2:  [87 %-100 %] 97 %  BP: ()/(51-73) 109/53     Weight: 66.7 kg (147 lb 0.8 oz)  Body mass index is 29.7 kg/m².      Intake/Output Summary (Last 24 hours) at 12/28/2018 0636  Last data filed at 12/28/2018 0217  Gross per 24 hour   Intake --   Output 250 ml   Net -250 ml       Cervical Exam: Visually closed on 12/26     Significant Labs:  Recent Lab Results       12/27/18  1034        Group & Rh A POS     INDIRECT JAIRON NEG           Physical Exam:   Constitutional: She is oriented to person, place, and time. She appears well-developed and well-nourished. No distress.    HENT:   Head: Normocephalic and atraumatic.       Pulmonary/Chest: Effort normal. No respiratory distress.        Abdominal: Soft. She exhibits no distension. There is no tenderness. There is no rebound and no guarding.             Musculoskeletal: Moves all extremeties.       Neurological: She is alert and oriented to person, place, and time.    Skin: Skin is warm and dry. She is not diaphoretic.    Psychiatric: She has a normal mood and affect. Her behavior is normal. Judgment and thought content normal.

## 2018-12-28 NOTE — PROGRESS NOTES
LABOR NOTE    S: No complaints.     O:   Temp:  [96.6 °F (35.9 °C)-98.6 °F (37 °C)] 96.7 °F (35.9 °C)  Pulse:  [] 92  Resp:  [11-18] 18  SpO2:  [87 %-100 %] 100 %  BP: ()/(51-73) 89/51    Cervix 3-4cm, with fetal legs in the vagina. Cervix very tight around fetal abdomen. Did not deliver with maternal pushing effort.   400 Cytotec placed vaginally.    ASSESSMENT:   33 y.o.  at 19w5d, IOL 2/2 PPROM @ 19w4d.    Active Hospital Problems    Diagnosis  POA    *Possible Leakage of amniotic fluid [O42.90]  Unknown     premature rupture of membranes (PPROM) with unknown onset of labor [O42.919]  Yes    Vaginal discharge [N89.8]  Yes      Resolved Hospital Problems   No resolved problems to display.     TIMELINE:  1045: vaginal cytotec 800, cervix closed  1445: vaginal cytotec 400, cervix closed  1900: vaginal cytotec 400, cervix closed/50/-3  2300: fetal parts in vagina, no further cytotec given  0100: delivered to the fetal abdomen. Cervix very tight around body. Cytotec 400 PV placed.    PLAN:  - will recheck in 30 min - 1 hour  - pain mgmt with epidural per anesthesia    Jatin Carvajal M.D.  Obstetrics & Gynecology  PGY-2

## 2018-12-28 NOTE — PLAN OF CARE
Sw consulted to see pt: Counseling. Pt is a 32yo female with IUFD. Sw met with pt in room 342. Pt's SO/FOB present as well as pt's aunt and uncle. Pt was alert and oriented, yet tearful. Pt's uncle requested to baptize pt's infant. YA Madsen agreed to call to the [facility] to retrieve infant for Alevism.  Discussed cremation and burial options. Pt and SO shared that they prefer to have infant cremated. Sw informed both of low-cost cremation services provided by [employer] and provide the telephone number. Sw advised pt that her infant's remains would not be released until the spiritual care dept is notified by either parent. Both voiced understanding. Sw provided counseling with regards to the stages of grief and loss. Sw also identified support resources in the  and Infant Loss brochure.     Pt had no immediate needs. Sw available should any other needs arise.     [facility] Saxena [facility]  [facility]   Ext. [phone]-phone  [email or web address]

## 2018-12-28 NOTE — PLAN OF CARE
Problem: Adult Inpatient Plan of Care  Goal: Plan of Care Review  Postpartum progress WDL; grieving appropriately; family support;  talked to patient; patient decided on cremation for baby; discharged to home c family

## 2018-12-28 NOTE — PROGRESS NOTES
LABOR NOTE    S: Worsening abdominal cramping as well as low back pain. S/p PCA, now with Epidural with good pain relief.    O:   Temp:  [96.6 °F (35.9 °C)-98.6 °F (37 °C)] 96.7 °F (35.9 °C)  Pulse:  [] 92  Resp:  [11-18] 18  SpO2:  [87 %-100 %] 100 %  BP: ()/(51-73) 89/51    Cervix 3cm, with fetal leg in the vagina.    ASSESSMENT:   33 y.o.  at 19w5d, IOL 2/2 PPROM @ 19w4d.    Active Hospital Problems    Diagnosis  POA    *Possible Leakage of amniotic fluid [O42.90]  Unknown     premature rupture of membranes (PPROM) with unknown onset of labor [O42.919]  Yes    Vaginal discharge [N89.8]  Yes      Resolved Hospital Problems   No resolved problems to display.     TIMELINE:  1045: vaginal cytotec 800, cervix closed  1445: vaginal cytotec 400, cervix closed  1900: vaginal cytotec 400, cervix closed/50/-3  2300: fetal parts in vagina, no further cytotec given    PLAN:  - will recheck in 30 min - 1 hour  - cervical checks PRN  - pain mgmt with epidural per anesthesia    Jatin Carvajal M.D.  Obstetrics & Gynecology  PGY-2

## 2018-12-28 NOTE — DISCHARGE INSTRUCTIONS
Call your doctor at [facility] to set up a postpartum visit; refer to Postpartum discharge booklet for discharge instructions; refer to Managing Non-Nursing Engorgement for breast care;  call your doctor of L&D OB-ED, [phone] for vaginal bleeding that heavy--soaking a pad in less than 1 hour or passing clots, fever over 100.4, abdominal pain not relieved with pain med, feeling dizzy, passing out, emotional distress; any other urgent problems

## 2019-01-03 NOTE — ANESTHESIA POSTPROCEDURE EVALUATION
"Anesthesia Post Evaluation    Patient: Dyan Ferrara    Procedure(s) Performed: * No procedures listed *    Final Anesthesia Type: epidural  Patient location during evaluation: labor & delivery  Patient participation: Yes- Able to Participate  Level of consciousness: awake and alert and oriented  Post-procedure vital signs: reviewed and stable  Pain management: adequate  Airway patency: patent  PONV status at discharge: No PONV  Anesthetic complications: no      Cardiovascular status: blood pressure returned to baseline  Respiratory status: unassisted  Hydration status: euvolemic  Follow-up not needed.        Visit Vitals  BP (!) 87/50 (BP Location: Left arm, Patient Position: Lying)   Pulse 76   Temp 36.6 °C (97.9 °F) (Oral)   Resp 16   Ht 4' 11" (1.499 m)   Wt 66.7 kg (147 lb 0.8 oz)   LMP 08/11/2018   SpO2 97%   Breastfeeding? No   BMI 29.70 kg/m²       Pain/Eugenia Score: No Data Recorded      "

## 2019-01-18 ENCOUNTER — POSTPARTUM VISIT (OUTPATIENT)
Dept: OBSTETRICS AND GYNECOLOGY | Facility: CLINIC | Age: 34
End: 2019-01-18
Payer: OTHER GOVERNMENT

## 2019-01-18 VITALS
WEIGHT: 144.19 LBS | DIASTOLIC BLOOD PRESSURE: 64 MMHG | SYSTOLIC BLOOD PRESSURE: 106 MMHG | BODY MASS INDEX: 29.07 KG/M2 | HEIGHT: 59 IN

## 2019-01-18 PROBLEM — O42.90 LEAKAGE OF AMNIOTIC FLUID: Status: RESOLVED | Noted: 2018-12-26 | Resolved: 2019-01-18

## 2019-01-18 PROBLEM — O42.919 PRETERM PREMATURE RUPTURE OF MEMBRANES (PPROM) WITH UNKNOWN ONSET OF LABOR: Status: RESOLVED | Noted: 2018-12-27 | Resolved: 2019-01-18

## 2019-01-18 PROBLEM — N89.8 VAGINAL DISCHARGE: Status: RESOLVED | Noted: 2018-12-26 | Resolved: 2019-01-18

## 2019-01-18 PROCEDURE — 99999 PR PBB SHADOW E&M-EST. PATIENT-LVL III: ICD-10-PCS | Mod: PBBFAC,,, | Performed by: OBSTETRICS & GYNECOLOGY

## 2019-01-18 PROCEDURE — 99999 PR PBB SHADOW E&M-EST. PATIENT-LVL III: CPT | Mod: PBBFAC,,, | Performed by: OBSTETRICS & GYNECOLOGY

## 2019-01-18 PROCEDURE — 99213 OFFICE O/P EST LOW 20 MIN: CPT | Mod: PBBFAC | Performed by: OBSTETRICS & GYNECOLOGY

## 2019-01-18 RX ORDER — CITALOPRAM 40 MG/1
40 TABLET, FILM COATED ORAL DAILY
Qty: 30 TABLET | Refills: 2 | Status: SHIPPED | OUTPATIENT
Start: 2019-01-18 | End: 2019-04-01

## 2019-01-18 NOTE — PROGRESS NOTES
PT HERE S/P INDUCTION @ 19 WEEK PPROM .  HAD PNC AT [facility] WITH NO PROBLEMS PRIOR TO PPROM.  PHYSICALLY FINE BUT HAVING TROUBLE EMOTIONALLY. NO SELF HARM THOUGHTS    ROS:  GENERAL: No fever, chills, fatigability or weight loss.  VULVAR: No pain, no lesions and no itching.  VAGINAL: No relaxation, no itching, no discharge, no abnormal bleeding and no lesions.  ABDOMEN: No abdominal pain. Denies nausea. Denies vomiting. No diarrhea. No constipation  BREAST: Denies pain. No lumps. No discharge.  URINARY: No incontinence, no nocturia, no frequency and no dysuria.  CARDIOVASCULAR: No chest pain. No shortness of breath. No leg cramps.  NEUROLOGICAL: No headaches. No vision changes.  The remainder of the review of systems was negative.    PE:  General Appearance: overweight And Well developed. Well nourished. In no acute distress.      PROCEDURES:    PLAN:     DIAGNOSIS:  1. Postpartum care and examination immediately after delivery    2. Depression affecting pregnancy, postpartum        MEDICATIONS & ORDERS:  Orders Placed This Encounter    Ambulatory referral to Psychiatry    citalopram (CELEXA) 40 MG tablet     20 MIN D/W PT AND     FOLLOW-UP: With me in PRN month  SOCIAL WORK CONSULT

## 2019-02-21 ENCOUNTER — OFFICE VISIT (OUTPATIENT)
Dept: OBSTETRICS AND GYNECOLOGY | Facility: CLINIC | Age: 34
End: 2019-02-21
Payer: OTHER GOVERNMENT

## 2019-02-21 VITALS
SYSTOLIC BLOOD PRESSURE: 104 MMHG | WEIGHT: 140 LBS | DIASTOLIC BLOOD PRESSURE: 72 MMHG | BODY MASS INDEX: 28.22 KG/M2 | HEIGHT: 59 IN

## 2019-02-21 DIAGNOSIS — N89.8 VAGINAL DISCHARGE: Primary | ICD-10-CM

## 2019-02-21 DIAGNOSIS — F51.02 ADJUSTMENT INSOMNIA: ICD-10-CM

## 2019-02-21 DIAGNOSIS — Z11.3 SCREENING FOR STD (SEXUALLY TRANSMITTED DISEASE): ICD-10-CM

## 2019-02-21 PROCEDURE — 99213 OFFICE O/P EST LOW 20 MIN: CPT | Mod: S$PBB,24,, | Performed by: OBSTETRICS & GYNECOLOGY

## 2019-02-21 PROCEDURE — 99213 PR OFFICE/OUTPT VISIT, EST, LEVL III, 20-29 MIN: ICD-10-PCS | Mod: S$PBB,24,, | Performed by: OBSTETRICS & GYNECOLOGY

## 2019-02-21 PROCEDURE — 99213 OFFICE O/P EST LOW 20 MIN: CPT | Mod: PBBFAC | Performed by: OBSTETRICS & GYNECOLOGY

## 2019-02-21 PROCEDURE — 99999 PR PBB SHADOW E&M-EST. PATIENT-LVL III: CPT | Mod: PBBFAC,,, | Performed by: OBSTETRICS & GYNECOLOGY

## 2019-02-21 PROCEDURE — 87480 CANDIDA DNA DIR PROBE: CPT

## 2019-02-21 PROCEDURE — 87491 CHLMYD TRACH DNA AMP PROBE: CPT

## 2019-02-21 PROCEDURE — 87510 GARDNER VAG DNA DIR PROBE: CPT

## 2019-02-21 PROCEDURE — 99999 PR PBB SHADOW E&M-EST. PATIENT-LVL III: ICD-10-PCS | Mod: PBBFAC,,, | Performed by: OBSTETRICS & GYNECOLOGY

## 2019-02-21 RX ORDER — METRONIDAZOLE 500 MG/1
500 TABLET ORAL 2 TIMES DAILY
Qty: 14 TABLET | Refills: 0 | Status: SHIPPED | OUTPATIENT
Start: 2019-02-21 | End: 2019-02-28

## 2019-02-21 NOTE — PROGRESS NOTES
Bacterial vaginosis DNA Negative   Candida glabrata DNA Positive (A)   Candida krusei DNA Negative   Candida sp Positive (A)   Chlamydia, Amplified DNA Not Detected   N gonorrhoeae, amplified DNA Not Detected   Trichomonas vaginalis Negative   YEAST    PT HERE FOR VAGINAL D/C WITH YEAST TX WITH MONISTAT 1 AND NOW WITH ODOR.  WANTS STD SCREEN.  NOT TAKING CELEXA BC MADE HER FEEL WEIRD BUT TROUBLE SLEEPING IN A QUIET HOUSE AT NIGHT.    ROS:  GENERAL: No fever, chills, fatigability or weight loss.  VULVAR: No pain, no lesions and no itching.  VAGINAL: No relaxation, no itching, no discharge, no abnormal bleeding and no lesions.  ABDOMEN: No abdominal pain. Denies nausea. Denies vomiting. No diarrhea. No constipation  BREAST: Denies pain. No lumps. No discharge.  URINARY: No incontinence, no nocturia, no frequency and no dysuria.  CARDIOVASCULAR: No chest pain. No shortness of breath. No leg cramps.  NEUROLOGICAL: No headaches. No vision changes.  The remainder of the review of systems was negative.    PE:  General Appearance: overweight And Well developed. Well nourished. In no acute distress.  Vulva: Lesions: No.  Urethral Meatus: Normal size. Normal location. No lesions. No prolapse.  Urethra: No masses. No tenderness. No prolapse. No scarring.  Bladder: No masses. No tenderness.  Vagina: Mucosa NI:yes discharge yes, atrophy no, cystocele no or rectocele no.  Cervix: Lesion: no  Stenotic: no Cervical motion tenderness: no    PROCEDURES:    PLAN:     DIAGNOSIS:  1. Vaginal discharge    2. Adjustment insomnia    3. Screening for STD (sexually transmitted disease)        MEDICATIONS & ORDERS:  Orders Placed This Encounter    C. trachomatis/N. gonorrhoeae by AMP DNA    Vaginosis Screen by DNA Probe    HIV 1/2 Ag/Ab (4th Gen)    RPR    Hepatitis B surface antigen    Hepatitis C antibody    Ambulatory referral to Psychiatry    metroNIDAZOLE (FLAGYL) 500 MG tablet    terconazole (TERAZOL 7) 0.4 % Crea          FOLLOW-UP: With elsa SANTAMARIAN

## 2019-02-22 LAB
C TRACH DNA SPEC QL NAA+PROBE: NOT DETECTED
N GONORRHOEA DNA SPEC QL NAA+PROBE: NOT DETECTED

## 2019-02-25 LAB
BACTERIAL VAGINOSIS DNA: NEGATIVE
CANDIDA GLABRATA DNA: POSITIVE
CANDIDA KRUSEI DNA: NEGATIVE
CANDIDA RRNA VAG QL PROBE: POSITIVE
G VAGINALIS RRNA GENITAL QL PROBE: ABNORMAL
T VAGINALIS RRNA GENITAL QL PROBE: NEGATIVE

## 2019-02-27 RX ORDER — TERCONAZOLE 4 MG/G
1 CREAM VAGINAL NIGHTLY
Qty: 1 TUBE | Refills: 1 | Status: SHIPPED | OUTPATIENT
Start: 2019-02-27 | End: 2019-03-06

## 2019-03-19 ENCOUNTER — TELEPHONE (OUTPATIENT)
Dept: INTERNAL MEDICINE | Facility: CLINIC | Age: 34
End: 2019-03-19

## 2019-04-01 ENCOUNTER — TELEPHONE (OUTPATIENT)
Dept: OBSTETRICS AND GYNECOLOGY | Facility: CLINIC | Age: 34
End: 2019-04-01

## 2019-04-01 ENCOUNTER — OFFICE VISIT (OUTPATIENT)
Dept: OBSTETRICS AND GYNECOLOGY | Facility: CLINIC | Age: 34
End: 2019-04-01
Payer: OTHER GOVERNMENT

## 2019-04-01 VITALS
BODY MASS INDEX: 26.22 KG/M2 | HEIGHT: 59 IN | WEIGHT: 130.06 LBS | DIASTOLIC BLOOD PRESSURE: 72 MMHG | SYSTOLIC BLOOD PRESSURE: 102 MMHG

## 2019-04-01 DIAGNOSIS — N30.01 ACUTE CYSTITIS WITH HEMATURIA: Primary | ICD-10-CM

## 2019-04-01 DIAGNOSIS — Z20.2 POSSIBLE EXPOSURE TO STD: ICD-10-CM

## 2019-04-01 DIAGNOSIS — N30.00 ACUTE CYSTITIS WITHOUT HEMATURIA: Primary | ICD-10-CM

## 2019-04-01 LAB
BILIRUB SERPL-MCNC: ABNORMAL MG/DL
BLOOD URINE, POC: 250
CANDIDA RRNA VAG QL PROBE: POSITIVE
COLOR, POC UA: YELLOW
G VAGINALIS RRNA GENITAL QL PROBE: NEGATIVE
GLUCOSE UR QL STRIP: NORMAL
KETONES UR QL STRIP: ABNORMAL
LEUKOCYTE ESTERASE URINE, POC: ABNORMAL
NITRITE, POC UA: ABNORMAL
PH, POC UA: 5
PROTEIN, POC: ABNORMAL
SPECIFIC GRAVITY, POC UA: 1
T VAGINALIS RRNA GENITAL QL PROBE: NEGATIVE
UROBILINOGEN, POC UA: NORMAL

## 2019-04-01 PROCEDURE — 99213 PR OFFICE/OUTPT VISIT, EST, LEVL III, 20-29 MIN: ICD-10-PCS | Mod: S$PBB,,, | Performed by: NURSE PRACTITIONER

## 2019-04-01 PROCEDURE — 87088 URINE BACTERIA CULTURE: CPT

## 2019-04-01 PROCEDURE — 87077 CULTURE AEROBIC IDENTIFY: CPT

## 2019-04-01 PROCEDURE — 99213 OFFICE O/P EST LOW 20 MIN: CPT | Mod: PBBFAC | Performed by: NURSE PRACTITIONER

## 2019-04-01 PROCEDURE — 99999 PR PBB SHADOW E&M-EST. PATIENT-LVL III: ICD-10-PCS | Mod: PBBFAC,,, | Performed by: NURSE PRACTITIONER

## 2019-04-01 PROCEDURE — 87480 CANDIDA DNA DIR PROBE: CPT

## 2019-04-01 PROCEDURE — 99213 OFFICE O/P EST LOW 20 MIN: CPT | Mod: S$PBB,,, | Performed by: NURSE PRACTITIONER

## 2019-04-01 PROCEDURE — 87186 SC STD MICRODIL/AGAR DIL: CPT

## 2019-04-01 PROCEDURE — 81002 URINALYSIS NONAUTO W/O SCOPE: CPT | Mod: PBBFAC | Performed by: NURSE PRACTITIONER

## 2019-04-01 PROCEDURE — 87086 URINE CULTURE/COLONY COUNT: CPT

## 2019-04-01 PROCEDURE — 87491 CHLMYD TRACH DNA AMP PROBE: CPT

## 2019-04-01 PROCEDURE — 99999 PR PBB SHADOW E&M-EST. PATIENT-LVL III: CPT | Mod: PBBFAC,,, | Performed by: NURSE PRACTITIONER

## 2019-04-01 PROCEDURE — 87510 GARDNER VAG DNA DIR PROBE: CPT

## 2019-04-01 RX ORDER — NITROFURANTOIN 25; 75 MG/1; MG/1
100 CAPSULE ORAL 2 TIMES DAILY
Qty: 10 CAPSULE | Refills: 0 | Status: SHIPPED | OUTPATIENT
Start: 2019-04-01 | End: 2019-04-06

## 2019-04-01 NOTE — PROGRESS NOTES
CC: Dysuria    HPI: Pt is a 33 y.o.  female who presents for evaluation of her UTI symptoms.   The patient presents today with dysuria, frequency, and urgency for the past 2-3 weeks. The patient denies flank pain, fever, nausea, vomiting, and hematuria. She denies frequent or recurrent UTIs. She reports possible exposure to gonorrhea from a new sexual partner. Denies vaginal symptoms. She desires STD testing.  Alleviating factors: None    ROS:  GENERAL: Feeling well overall. Denies fever or chills.   SKIN: Denies rash or lesions.   HEAD: Denies head injury or headache.   NODES: Denies enlarged lymph nodes.   CHEST: Denies chest pain or shortness of breath.   CARDIOVASCULAR: Denies palpitations or left sided chest pain.   ABDOMEN: No abdominal pain, constipation, diarrhea, nausea, vomiting or rectal bleeding.   URINARY: + dysuria, hematuria, or burning on urination.  REPRODUCTIVE: See HPI.   BREASTS: Denies pain, lumps, or nipple discharge.   HEMATOLOGIC: No easy bruisability or excessive bleeding.   MUSCULOSKELETAL: Denies joint pain or swelling.   NEUROLOGIC: Denies syncope or weakness.   PSYCHIATRIC: Denies depression, anxiety or mood swings.    PE:   APPEARANCE: Well nourished, well developed,    White  Patient Refused female in no acute distress.  PELVIS: Deferred  ABDOMEN: No suprapubic tenderness  VULVA: normal appearing vulva with no masses, tenderness or lesions   VAGINA: normal appearing vagina with no abnormal discharge, no lesions   CERVIX: normal appearing cervix without discharge or lesions   UTERUS: uterus is normal size, shape, consistency and nontender   ADNEXA: normal adnexa in size, nontender and no masses  MUSCULOSKELETAL: No CVA tenderness      Diagnosis:  1. Acute cystitis with hematuria    2. Possible exposure to STD        Plan:     Orders Placed This Encounter    Urine culture    C. trachomatis/N. gonorrhoeae by AMP DNA [facility]; Vagina    POCT URINE DIPSTICK WITHOUT MICROSCOPE    POCT  Vaginosis Screen by DNA Probe ([facility])       -UTI prevention including   a. perineal hygiene, wipe front to back,  b. drink water prior to intercourse and urinate afterwards and avoid certain positions which could increase likelyhood of UTIs,  c. establish regular bladder habits,   d. avoid vulvovaginal irritants,   e. increase fluids and avoid caffeine and alcohol;  -signs of pylenephritis and to go to the ED if develops fever, chills, n/v, back pain, worsening dyuria, hematuria;   -pelvic rest until symptoms resolve;  -Macrobid use and potential side effects;  -A.C.S. Pap and pelvic exam guidelines (pap every 3 years), recomendations for yearly mammogram;  -to follow up with her PCP for other health maintenance.    GC and Affirm testing     Dr. Valentin sent a prescription for Macrobid 100 mg BID x 5 days to [facility] on [address], patient will  the medication.    Follow-up PRN no resolution of symptoms.

## 2019-04-01 NOTE — TELEPHONE ENCOUNTER
----- Message from Dawit Bolaños sent at 4/1/2019  9:24 AM CDT -----  Please call pt she has a [phone]

## 2019-04-02 ENCOUNTER — PATIENT MESSAGE (OUTPATIENT)
Dept: OBSTETRICS AND GYNECOLOGY | Facility: CLINIC | Age: 34
End: 2019-04-02

## 2019-04-02 LAB
C TRACH DNA SPEC QL NAA+PROBE: NOT DETECTED
N GONORRHOEA DNA SPEC QL NAA+PROBE: NOT DETECTED

## 2019-04-02 RX ORDER — FLUCONAZOLE 150 MG/1
150 TABLET ORAL DAILY
Qty: 1 TABLET | Refills: 1 | Status: SHIPPED | OUTPATIENT
Start: 2019-04-02 | End: 2019-05-02

## 2019-04-04 LAB — BACTERIA UR CULT: NORMAL

## 2019-04-15 ENCOUNTER — PATIENT MESSAGE (OUTPATIENT)
Dept: OBSTETRICS AND GYNECOLOGY | Facility: CLINIC | Age: 34
End: 2019-04-15

## 2019-04-16 ENCOUNTER — OFFICE VISIT (OUTPATIENT)
Dept: OBSTETRICS AND GYNECOLOGY | Facility: CLINIC | Age: 34
End: 2019-04-16
Payer: OTHER GOVERNMENT

## 2019-04-16 VITALS
HEIGHT: 59 IN | BODY MASS INDEX: 27.32 KG/M2 | DIASTOLIC BLOOD PRESSURE: 74 MMHG | WEIGHT: 135.5 LBS | SYSTOLIC BLOOD PRESSURE: 118 MMHG

## 2019-04-16 DIAGNOSIS — R39.89 SUSPECTED UTI: Primary | ICD-10-CM

## 2019-04-16 PROCEDURE — 87077 CULTURE AEROBIC IDENTIFY: CPT

## 2019-04-16 PROCEDURE — 87186 SC STD MICRODIL/AGAR DIL: CPT

## 2019-04-16 PROCEDURE — 87088 URINE BACTERIA CULTURE: CPT

## 2019-04-16 PROCEDURE — 99213 OFFICE O/P EST LOW 20 MIN: CPT | Mod: PBBFAC | Performed by: NURSE PRACTITIONER

## 2019-04-16 PROCEDURE — 99999 PR PBB SHADOW E&M-EST. PATIENT-LVL III: ICD-10-PCS | Mod: PBBFAC,,, | Performed by: NURSE PRACTITIONER

## 2019-04-16 PROCEDURE — 99213 OFFICE O/P EST LOW 20 MIN: CPT | Mod: S$PBB,,, | Performed by: NURSE PRACTITIONER

## 2019-04-16 PROCEDURE — 99213 PR OFFICE/OUTPT VISIT, EST, LEVL III, 20-29 MIN: ICD-10-PCS | Mod: S$PBB,,, | Performed by: NURSE PRACTITIONER

## 2019-04-16 PROCEDURE — 87086 URINE CULTURE/COLONY COUNT: CPT

## 2019-04-16 PROCEDURE — 99999 PR PBB SHADOW E&M-EST. PATIENT-LVL III: CPT | Mod: PBBFAC,,, | Performed by: NURSE PRACTITIONER

## 2019-04-16 RX ORDER — SULFAMETHOXAZOLE AND TRIMETHOPRIM 800; 160 MG/1; MG/1
1 TABLET ORAL 2 TIMES DAILY
Qty: 14 TABLET | Refills: 0 | Status: SHIPPED | OUTPATIENT
Start: 2019-04-16 | End: 2019-04-23

## 2019-04-16 RX ORDER — NITROFURANTOIN 25; 75 MG/1; MG/1
100 CAPSULE ORAL 2 TIMES DAILY
Qty: 14 CAPSULE | Refills: 0 | Status: SHIPPED | OUTPATIENT
Start: 2019-04-16 | End: 2019-04-16 | Stop reason: CLARIF

## 2019-04-16 RX ORDER — PHENAZOPYRIDINE HYDROCHLORIDE 200 MG/1
200 TABLET, FILM COATED ORAL 3 TIMES DAILY PRN
Qty: 20 TABLET | Refills: 0 | Status: SHIPPED | OUTPATIENT
Start: 2019-04-16 | End: 2020-04-15

## 2019-04-16 NOTE — PROGRESS NOTES
Chief Complaint   Patient presents with    Vaginal Pain       History of Present Illness: Dyan Ferrara is a 33 y.o. female that presents today 4/16/2019 for   Pt [facility] c/o burning with urination and frequency x few days. She was seen on 4/1/19 for same symptoms and diagnosed with UTI; treated with macrobid. She also reports having another UTI shortly before that. After this last diagnosis and treatment, she only felt better for about 2 days and then the symptoms reappeared. She did complete the entire prescription. She denies any vaginal complaints. No other complaints or concerns noted.       Past Medical History:   Diagnosis Date    ADD (attention deficit disorder) 4/24/2014    Adderall XR 20mg qd, 5 mg plain in pm, tried Concerta but didn't help, ADD eval on chart 2013    Allergic dermatitis     takes medrol prior to & after getting tatoos bc of hives    Seborrheic dermatitis of scalp     ketoconazole 2% shampoo helps       Past Surgical History:   Procedure Laterality Date    breast augmentation         Current Outpatient Medications   Medication Sig Dispense Refill    fluconazole (DIFLUCAN) 150 MG Tab Take 1 tablet (150 mg total) by mouth once daily. 1 tablet 1    phenazopyridine (PYRIDIUM) 200 MG tablet Take 1 tablet (200 mg total) by mouth 3 (three) times daily as needed for Pain. 20 tablet 0    sulfamethoxazole-trimethoprim 800-160mg (BACTRIM DS) 800-160 mg Tab Take 1 tablet by mouth 2 (two) times daily. for 7 days 14 tablet 0     No current facility-administered medications for this visit.        Review of patient's allergies indicates:  No Known Allergies    Family History   Problem Relation Age of Onset    Breast cancer Neg Hx     Cancer Neg Hx     Ovarian cancer Neg Hx        Social History     Tobacco Use    Smoking status: Never Smoker    Smokeless tobacco: Never Used   Substance Use Topics    Alcohol use: No     Frequency: Never     Comment: socially  "   Drug use: Yes     Types: Marijuana     Comment: today       OB History    Para Term  AB Living   4 1     3     SAB TAB Ectopic Multiple Live Births   2     0        # Outcome Date GA Lbr Odilon/2nd Weight Sex Delivery Anes PTL Lv   4 Para 18 19w6d / 00:07 0.325 kg (11.5 oz) M Vag-Spont EPI Y FD   3 SAB            2 SAB            1 AB                Review of Symptoms:  GENERAL: Denies weight gain or weight loss. Feeling well overall.   SKIN: Denies rash or lesions.   HEAD: Denies head injury or headache.   ABDOMEN: No abdominal pain, constipation, diarrhea, nausea, vomiting or rectal bleeding.   URINARY: see HPI    /74   Ht 4' 11" (1.499 m)   Wt 61.4 kg (135 lb 7.6 oz)   LMP 2019   Physical Exam:  APPEARANCE: Well nourished, well developed, in no acute distress.  SKIN: Normal skin turgor, no lesions.  RESPIRATORY: Normal respiratory effort with no retractions or use of accessory muscles  PELVIC: deferred    ASSESSMENT/PLAN:  Suspected UTI  -     POCT URINE DIPSTICK WITHOUT MICROSCOPE  -     Urine culture    Other orders  -     Discontinue: nitrofurantoin, macrocrystal-monohydrate, (MACROBID) 100 MG capsule; Take 1 capsule (100 mg total) by mouth 2 (two) times daily. for 7 days  Dispense: 14 capsule; Refill: 0  -     phenazopyridine (PYRIDIUM) 200 MG tablet; Take 1 tablet (200 mg total) by mouth 3 (three) times daily as needed for Pain.  Dispense: 20 tablet; Refill: 0  -     sulfamethoxazole-trimethoprim 800-160mg (BACTRIM DS) 800-160 mg Tab; Take 1 tablet by mouth 2 (two) times daily. for 7 days  Dispense: 14 tablet; Refill: 0      -UTI precautions:  Wipe front to back and avoid constipation.  Avoid caffeine.  Drink lots of water  Void every 3-4 hrs.  Expel urine from vagina post void  No dryer sheets or harsh detergents with the undergarments  No bubble baths  Void before and after intercourse  Avoid hot tub use  Void soon after urge arises  Avoid tight fitting clothes and panty " hose  Cranberry supplement    Total duration face to face visit time 15 minutes.   Total time spent counseling greater than fifty percent of total visit time.  Counseling included discussion of treatment options and risks and benefits.       Follow-up:  Will f/u once results are received  RTC lack of improvement  RTC as needed

## 2019-04-20 LAB — BACTERIA UR CULT: NORMAL

## 2019-04-22 ENCOUNTER — PATIENT MESSAGE (OUTPATIENT)
Dept: OBSTETRICS AND GYNECOLOGY | Facility: CLINIC | Age: 34
End: 2019-04-22

## 2019-08-01 ENCOUNTER — OFFICE VISIT (OUTPATIENT)
Dept: OBSTETRICS AND GYNECOLOGY | Facility: CLINIC | Age: 34
End: 2019-08-01
Payer: OTHER GOVERNMENT

## 2019-08-01 ENCOUNTER — TELEPHONE (OUTPATIENT)
Dept: OBSTETRICS AND GYNECOLOGY | Facility: CLINIC | Age: 34
End: 2019-08-01

## 2019-08-01 VITALS
DIASTOLIC BLOOD PRESSURE: 70 MMHG | SYSTOLIC BLOOD PRESSURE: 114 MMHG | WEIGHT: 123 LBS | BODY MASS INDEX: 24.8 KG/M2 | HEIGHT: 59 IN

## 2019-08-01 DIAGNOSIS — N89.8 VAGINAL ODOR: ICD-10-CM

## 2019-08-01 DIAGNOSIS — R39.9 UTI SYMPTOMS: Primary | ICD-10-CM

## 2019-08-01 LAB
CANDIDA RRNA VAG QL PROBE: NEGATIVE
G VAGINALIS RRNA GENITAL QL PROBE: NEGATIVE
T VAGINALIS RRNA GENITAL QL PROBE: NEGATIVE

## 2019-08-01 PROCEDURE — 87661 TRICHOMONAS VAGINALIS AMPLIF: CPT

## 2019-08-01 PROCEDURE — 99213 PR OFFICE/OUTPT VISIT, EST, LEVL III, 20-29 MIN: ICD-10-PCS | Mod: S$PBB,,, | Performed by: NURSE PRACTITIONER

## 2019-08-01 PROCEDURE — 87186 SC STD MICRODIL/AGAR DIL: CPT

## 2019-08-01 PROCEDURE — 99213 OFFICE O/P EST LOW 20 MIN: CPT | Mod: S$PBB,,, | Performed by: NURSE PRACTITIONER

## 2019-08-01 PROCEDURE — 87086 URINE CULTURE/COLONY COUNT: CPT

## 2019-08-01 PROCEDURE — 99999 PR PBB SHADOW E&M-EST. PATIENT-LVL III: CPT | Mod: PBBFAC,,, | Performed by: NURSE PRACTITIONER

## 2019-08-01 PROCEDURE — 99999 PR PBB SHADOW E&M-EST. PATIENT-LVL III: ICD-10-PCS | Mod: PBBFAC,,, | Performed by: NURSE PRACTITIONER

## 2019-08-01 PROCEDURE — 87077 CULTURE AEROBIC IDENTIFY: CPT

## 2019-08-01 PROCEDURE — 99213 OFFICE O/P EST LOW 20 MIN: CPT | Mod: PBBFAC | Performed by: NURSE PRACTITIONER

## 2019-08-01 PROCEDURE — 87088 URINE BACTERIA CULTURE: CPT

## 2019-08-01 RX ORDER — FLUCONAZOLE 150 MG/1
150 TABLET ORAL DAILY
Qty: 1 TABLET | Refills: 1 | Status: SHIPPED | OUTPATIENT
Start: 2019-08-01 | End: 2019-08-31

## 2019-08-01 RX ORDER — NITROFURANTOIN 25; 75 MG/1; MG/1
100 CAPSULE ORAL 2 TIMES DAILY
Qty: 14 CAPSULE | Refills: 0 | Status: SHIPPED | OUTPATIENT
Start: 2019-08-01 | End: 2019-08-08

## 2019-08-01 RX ORDER — TINIDAZOLE 500 MG/1
2 TABLET ORAL
Qty: 8 TABLET | Refills: 0 | Status: SHIPPED | OUTPATIENT
Start: 2019-08-01 | End: 2019-08-03

## 2019-08-01 NOTE — PROGRESS NOTES
CC: uti; vaginal odor    History of Present Illness: Dyan Ferrara is a 34 y.o. female that presents today 8/1/2019   Pt [facility] c/o dysuria, cloudy urine and odor x few weeks. She also reports vaginal odor since yesterday. She denies any vaginal discharge, itching or burning. She denies the use of any scented products in the vaginal area or changing any detergents. No other complaints or concerns noted.       Past Medical History:   Diagnosis Date    ADD (attention deficit disorder) 4/24/2014    Adderall XR 20mg qd, 5 mg plain in pm, tried Concerta but didn't help, ADD eval on chart 2013    Allergic dermatitis     takes medrol prior to & after getting tatoos bc of hives    Seborrheic dermatitis of scalp     ketoconazole 2% shampoo helps       Past Surgical History:   Procedure Laterality Date    breast augmentation         Current Outpatient Medications   Medication Sig Dispense Refill    dextroamphetamine/amphetamine (ADDERALL ORAL) Take by mouth.      fluconazole (DIFLUCAN) 150 MG Tab Take 1 tablet (150 mg total) by mouth once daily. 1 tablet 1    nitrofurantoin, macrocrystal-monohydrate, (MACROBID) 100 MG capsule Take 1 capsule (100 mg total) by mouth 2 (two) times daily. for 7 days 14 capsule 0    phenazopyridine (PYRIDIUM) 200 MG tablet Take 1 tablet (200 mg total) by mouth 3 (three) times daily as needed for Pain. 20 tablet 0    tinidazole (TINDAMAX) 500 MG tablet Take 4 tablets (2 g total) by mouth daily with breakfast. for 2 days 8 tablet 0     No current facility-administered medications for this visit.        Review of patient's allergies indicates:  No Known Allergies    Family History   Problem Relation Age of Onset    Breast cancer Neg Hx     Cancer Neg Hx     Ovarian cancer Neg Hx        Social History     Tobacco Use    Smoking status: Never Smoker    Smokeless tobacco: Never Used   Substance Use Topics    Alcohol use: No     Frequency: Never      "Comment: socially    Drug use: Yes     Types: Marijuana     Comment: today       OB History    Para Term  AB Living   4 1     3     SAB TAB Ectopic Multiple Live Births   2     0        # Outcome Date GA Lbr Odilon/2nd Weight Sex Delivery Anes PTL Lv   4 Para 18 19w6d / 00:07 0.325 kg (11.5 oz) M Vag-Spont EPI Y FD   3 SAB            2 SAB            1 AB                Review of Symptoms:  GENERAL: Denies weight gain or weight loss. Feeling well overall.   SKIN: Denies rash or lesions.   HEAD: Denies head injury or headache.   NODES: Denies enlarged lymph nodes.   CHEST: Denies chest pain or shortness of breath.   CARDIOVASCULAR: Denies palpitations or left sided chest pain.   ABDOMEN: No abdominal pain, constipation, diarrhea, nausea, vomiting or rectal bleeding.   URINARY: see HPI    /70   Ht 4' 11.02" (1.499 m)   Wt 55.8 kg (123 lb)   LMP 2019   Physical Exam:  APPEARANCE: Well nourished, well developed, in no acute distress.  SKIN: Normal skin turgor, no lesions.  NECK: Neck symmetric without masses   RESPIRATORY: Normal respiratory effort with no retractions or use of accessory muscles  ABDOMEN: Soft. No tenderness or masses. No hepatosplenomegaly. No hernias.  PELVIC: Normal external female genitalia without lesions. Normal hair distribution. Adequate perineal body, normal urethral meatus. Urethra with no masses.  Bladder nontender. Vagina moist and well rugated without lesions, + scant amount of brown discharge. Cervix pink and without lesions. No significant cystocele or rectocele.     ASSESSMENT/PLAN:  UTI symptoms  -     POCT URINE DIPSTICK WITHOUT MICROSCOPE  -     Urine culture    Vaginal odor  -     Vaginosis Screen by DNA Probe    Other orders  -     nitrofurantoin, macrocrystal-monohydrate, (MACROBID) 100 MG capsule; Take 1 capsule (100 mg total) by mouth 2 (two) times daily. for 7 days  Dispense: 14 capsule; Refill: 0  -     fluconazole (DIFLUCAN) 150 MG Tab; Take 1 " tablet (150 mg total) by mouth once daily.  Dispense: 1 tablet; Refill: 1  -     tinidazole (TINDAMAX) 500 MG tablet; Take 4 tablets (2 g total) by mouth daily with breakfast. for 2 days  Dispense: 8 tablet; Refill: 0        -UTI precautions:  Wipe front to back and avoid constipation.  Avoid caffeine.  Drink lots of water  Void every 3-4 hrs.  Expel urine from vagina post void  No dryer sheets or harsh detergents with the undergarments  No bubble baths  Void before and after intercourse  Avoid hot tub use  Void soon after urge arises  Avoid tight fitting clothes and panty hose  Cranberry supplement    -Reinforced to avoid any scented products in the vaginal area such as bubble baths, bath bombs, scented soaps/body washes, douches, feminine washes, etc... Also wear cotton underwear and change out of wet/sweaty clothing as soon as possible. Pt verbalized understanding.      Follow-up:  Will f/u with results  RTC if symptoms worsen or do not improve  RTC as needed

## 2019-08-04 LAB — BACTERIA UR CULT: ABNORMAL

## 2019-08-05 ENCOUNTER — PATIENT MESSAGE (OUTPATIENT)
Dept: OBSTETRICS AND GYNECOLOGY | Facility: CLINIC | Age: 34
End: 2019-08-05

## 2019-08-22 ENCOUNTER — HOSPITAL ENCOUNTER (EMERGENCY)
Facility: HOSPITAL | Age: 34
Discharge: HOME | End: 2019-08-22
Attending: EMERGENCY MEDICINE
Payer: OTHER GOVERNMENT

## 2019-08-22 VITALS
HEART RATE: 70 BPM | HEIGHT: 59 IN | SYSTOLIC BLOOD PRESSURE: 118 MMHG | OXYGEN SATURATION: 100 % | TEMPERATURE: 98 F | RESPIRATION RATE: 18 BRPM | DIASTOLIC BLOOD PRESSURE: 58 MMHG | BODY MASS INDEX: 24.19 KG/M2 | WEIGHT: 120 LBS

## 2019-08-22 DIAGNOSIS — R11.2 NAUSEA AND VOMITING, INTRACTABILITY OF VOMITING NOT SPECIFIED, UNSPECIFIED VOMITING TYPE: ICD-10-CM

## 2019-08-22 DIAGNOSIS — R10.11 RUQ PAIN: Primary | ICD-10-CM

## 2019-08-22 LAB
ALBUMIN SERPL BCP-MCNC: 4.4 G/DL (ref 3.5–5.2)
ALP SERPL-CCNC: 46 U/L (ref 55–135)
ALT SERPL W/O P-5'-P-CCNC: 8 U/L (ref 10–44)
ANION GAP SERPL CALC-SCNC: 6 MMOL/L (ref 8–16)
AST SERPL-CCNC: 16 U/L (ref 10–40)
B-HCG UR QL: NEGATIVE
BASOPHILS # BLD AUTO: 0.03 K/UL (ref 0–0.2)
BASOPHILS NFR BLD: 0.4 % (ref 0–1.9)
BILIRUB SERPL-MCNC: 0.5 MG/DL (ref 0.1–1)
BILIRUB UR QL STRIP: NEGATIVE
BUN SERPL-MCNC: 6 MG/DL (ref 6–20)
CALCIUM SERPL-MCNC: 9.6 MG/DL (ref 8.7–10.5)
CHLORIDE SERPL-SCNC: 106 MMOL/L (ref 95–110)
CLARITY UR: CLEAR
CO2 SERPL-SCNC: 27 MMOL/L (ref 23–29)
COLOR UR: YELLOW
CREAT SERPL-MCNC: 0.8 MG/DL (ref 0.5–1.4)
DIFFERENTIAL METHOD: NORMAL
EOSINOPHIL # BLD AUTO: 0.1 K/UL (ref 0–0.5)
EOSINOPHIL NFR BLD: 0.6 % (ref 0–8)
ERYTHROCYTE [DISTWIDTH] IN BLOOD BY AUTOMATED COUNT: 13 % (ref 11.5–14.5)
EST. GFR  (AFRICAN AMERICAN): >60 ML/MIN/1.73 M^2
EST. GFR  (NON AFRICAN AMERICAN): >60 ML/MIN/1.73 M^2
GLUCOSE SERPL-MCNC: 97 MG/DL (ref 70–110)
GLUCOSE UR QL STRIP: NEGATIVE
HCT VFR BLD AUTO: 40.5 % (ref 37–48.5)
HGB BLD-MCNC: 13.3 G/DL (ref 12–16)
HGB UR QL STRIP: NEGATIVE
KETONES UR QL STRIP: ABNORMAL
LEUKOCYTE ESTERASE UR QL STRIP: NEGATIVE
LIPASE SERPL-CCNC: 8 U/L (ref 4–60)
LYMPHOCYTES # BLD AUTO: 1.6 K/UL (ref 1–4.8)
LYMPHOCYTES NFR BLD: 20.2 % (ref 18–48)
MCH RBC QN AUTO: 30.2 PG (ref 27–31)
MCHC RBC AUTO-ENTMCNC: 32.8 G/DL (ref 32–36)
MCV RBC AUTO: 92 FL (ref 82–98)
MONOCYTES # BLD AUTO: 0.5 K/UL (ref 0.3–1)
MONOCYTES NFR BLD: 6.5 % (ref 4–15)
NEUTROPHILS # BLD AUTO: 5.7 K/UL (ref 1.8–7.7)
NEUTROPHILS NFR BLD: 72.6 % (ref 38–73)
NITRITE UR QL STRIP: NEGATIVE
PH UR STRIP: 6 [PH] (ref 5–8)
PLATELET # BLD AUTO: 308 K/UL (ref 150–350)
PMV BLD AUTO: 10.2 FL (ref 9.2–12.9)
POTASSIUM SERPL-SCNC: 4.2 MMOL/L (ref 3.5–5.1)
PROT SERPL-MCNC: 7.5 G/DL (ref 6–8.4)
PROT UR QL STRIP: NEGATIVE
RBC # BLD AUTO: 4.41 M/UL (ref 4–5.4)
SODIUM SERPL-SCNC: 139 MMOL/L (ref 136–145)
SP GR UR STRIP: 1 (ref 1–1.03)
URN SPEC COLLECT METH UR: ABNORMAL
UROBILINOGEN UR STRIP-ACNC: NEGATIVE EU/DL
WBC # BLD AUTO: 7.87 K/UL (ref 3.9–12.7)

## 2019-08-22 PROCEDURE — 81003 URINALYSIS AUTO W/O SCOPE: CPT

## 2019-08-22 PROCEDURE — 63600175 PHARM REV CODE 636 W HCPCS: Performed by: PHYSICIAN ASSISTANT

## 2019-08-22 PROCEDURE — 80053 COMPREHEN METABOLIC PANEL: CPT

## 2019-08-22 PROCEDURE — 96375 TX/PRO/DX INJ NEW DRUG ADDON: CPT

## 2019-08-22 PROCEDURE — 81025 URINE PREGNANCY TEST: CPT

## 2019-08-22 PROCEDURE — 96374 THER/PROPH/DIAG INJ IV PUSH: CPT

## 2019-08-22 PROCEDURE — 96361 HYDRATE IV INFUSION ADD-ON: CPT

## 2019-08-22 PROCEDURE — 99284 EMERGENCY DEPT VISIT MOD MDM: CPT | Mod: 25

## 2019-08-22 PROCEDURE — 51701 INSERT BLADDER CATHETER: CPT

## 2019-08-22 PROCEDURE — 83690 ASSAY OF LIPASE: CPT

## 2019-08-22 PROCEDURE — 85025 COMPLETE CBC W/AUTO DIFF WBC: CPT

## 2019-08-22 RX ORDER — ONDANSETRON 4 MG/1
4 TABLET, ORALLY DISINTEGRATING ORAL EVERY 6 HOURS PRN
Qty: 20 TABLET | Refills: 0 | Status: SHIPPED | OUTPATIENT
Start: 2019-08-22 | End: 2019-08-27

## 2019-08-22 RX ORDER — KETOROLAC TROMETHAMINE 30 MG/ML
15 INJECTION, SOLUTION INTRAMUSCULAR; INTRAVENOUS
Status: COMPLETED | OUTPATIENT
Start: 2019-08-22 | End: 2019-08-22

## 2019-08-22 RX ORDER — PROMETHAZINE HYDROCHLORIDE 25 MG/1
25 TABLET ORAL EVERY 6 HOURS PRN
Qty: 12 TABLET | Refills: 0 | Status: SHIPPED | OUTPATIENT
Start: 2019-08-22 | End: 2019-08-25

## 2019-08-22 RX ORDER — IBUPROFEN 600 MG/1
600 TABLET ORAL EVERY 6 HOURS PRN
Qty: 20 TABLET | Refills: 0 | Status: SHIPPED | OUTPATIENT
Start: 2019-08-22 | End: 2019-08-27

## 2019-08-22 RX ORDER — PROMETHAZINE HYDROCHLORIDE 25 MG/1
25 TABLET ORAL EVERY 6 HOURS PRN
Qty: 12 TABLET | Refills: 0 | Status: SHIPPED | OUTPATIENT
Start: 2019-08-22 | End: 2019-08-22 | Stop reason: SDUPTHER

## 2019-08-22 RX ORDER — ONDANSETRON 2 MG/ML
4 INJECTION INTRAMUSCULAR; INTRAVENOUS
Status: COMPLETED | OUTPATIENT
Start: 2019-08-22 | End: 2019-08-22

## 2019-08-22 RX ORDER — OXYCODONE AND ACETAMINOPHEN 5; 325 MG/1; MG/1
1 TABLET ORAL EVERY 4 HOURS PRN
Qty: 15 TABLET | Refills: 0 | Status: SHIPPED | OUTPATIENT
Start: 2019-08-22 | End: 2019-08-27

## 2019-08-22 RX ADMIN — KETOROLAC TROMETHAMINE 15 MG: 30 INJECTION, SOLUTION INTRAMUSCULAR at 01:08

## 2019-08-22 RX ADMIN — SODIUM CHLORIDE 1000 ML: 0.9 INJECTION, SOLUTION INTRAVENOUS at 12:08

## 2019-08-22 RX ADMIN — ONDANSETRON 4 MG: 2 INJECTION INTRAMUSCULAR; INTRAVENOUS at 01:08

## 2019-08-22 RX ADMIN — KETOROLAC TROMETHAMINE 15 MG: 30 INJECTION, SOLUTION INTRAMUSCULAR at 02:08

## 2019-08-22 NOTE — DISCHARGE INSTRUCTIONS
Take Ibuprofen and Percocet for pain.   Take Zofran and Phenergan for nausea.   Follow up with primary care in 2 days. Return to ER for worsening symptoms or as needed.

## 2019-08-22 NOTE — ED PROVIDER NOTES
Encounter Date: 8/22/2019       History     Chief Complaint   Patient presents with    Emesis     pt c/o RUQ pain and vomiting for last few days, diagnosed with UTI last week.      This is a 34 y.o. Female with a PMHx of gallbladder polyp who presents to the ED complaining of 1 week history of constant 6/10 RUQ pain radiating to R flank worse with palpation. Pt reports developing N/V x5 that began 1 week ago. Symptoms are worse in the mornings. Denies hematemesis, diarrhea,  Fever, chills, dysuria, hematuria, frequency, or any other worsening or alleviating factors. PTA the pt was treated for UTI and prescribed to Macrobid. She has completed her antibiotics. She normally has 1 BM per day. Last BM was yesterday. She has not taken any medicine PTA.      The history is provided by the patient and medical records.     Review of patient's allergies indicates:  No Known Allergies  Past Medical History:   Diagnosis Date    ADD (attention deficit disorder) 4/24/2014    Adderall XR 20mg qd, 5 mg plain in pm, tried Concerta but didn't help, ADD eval on chart 2013    Allergic dermatitis     takes medrol prior to & after getting tatoos bc of hives    Gallbladder polyp     Seborrheic dermatitis of scalp     ketoconazole 2% shampoo helps     Past Surgical History:   Procedure Laterality Date    breast augmentation      CYST REMOVAL       Family History   Problem Relation Age of Onset    Breast cancer Neg Hx     Cancer Neg Hx     Ovarian cancer Neg Hx      Social History     Tobacco Use    Smoking status: Never Smoker    Smokeless tobacco: Never Used   Substance Use Topics    Alcohol use: No     Frequency: Never     Comment: socially    Drug use: Yes     Frequency: 7.0 times per week     Types: Marijuana     Review of Systems   Constitutional: Positive for appetite change. Negative for chills and fever.   HENT: Negative for congestion, rhinorrhea and trouble swallowing.    Eyes: Negative for redness.   Respiratory:  Negative for cough and stridor.    Gastrointestinal: Positive for abdominal pain and nausea.   Genitourinary: Negative for dysuria, flank pain, frequency, hematuria and urgency.   Musculoskeletal: Negative for back pain, myalgias and neck pain.   Skin: Negative for rash.   Neurological: Negative for syncope and speech difficulty.   Psychiatric/Behavioral: Negative for confusion.       Physical Exam     Initial Vitals [08/22/19 1030]   BP Pulse Resp Temp SpO2   123/82 77 18 98.3 °F (36.8 °C) 99 %      MAP       --         Physical Exam    Nursing note and vitals reviewed.  Constitutional: She appears well-developed and well-nourished.   HENT:   Head: Normocephalic.   Right Ear: External ear normal.   Left Ear: External ear normal.   Nose: Nose normal.   Mouth/Throat: Oropharynx is clear and moist.   Eyes: Conjunctivae are normal.   Cardiovascular: Normal rate and regular rhythm. Exam reveals no gallop and no friction rub.    No murmur heard.  Pulmonary/Chest: Breath sounds normal. She has no wheezes. She has no rhonchi. She has no rales.   Abdominal: Soft. Bowel sounds are normal. She exhibits no distension. There is tenderness in the right upper quadrant. There is no rigidity, no rebound, no guarding, no CVA tenderness, no tenderness at McBurney's point and negative Thakkar's sign.   Musculoskeletal: Normal range of motion.   Lymphadenopathy:     She has no cervical adenopathy.   Neurological: She is alert. She has normal strength. No cranial nerve deficit or sensory deficit.   Skin: Skin is warm and dry.   Psychiatric: She has a normal mood and affect.         ED Course   Procedures  Labs Reviewed   COMPREHENSIVE METABOLIC PANEL - Abnormal; Notable for the following components:       Result Value    Alkaline Phosphatase 46 (*)     ALT 8 (*)     Anion Gap 6 (*)     All other components within normal limits   URINALYSIS, REFLEX TO URINE CULTURE - Abnormal; Notable for the following components:    Ketones, UA Trace (*)      All other components within normal limits    Narrative:     In and Out Cath as needed it patient unable to void  Preferred Collection Type->Urine, Clean Catch   CBC W/ AUTO DIFFERENTIAL   LIPASE   LIPASE   PREGNANCY TEST, URINE RAPID          Imaging Results          US Abdomen Limited (Final result)  Result time 08/22/19 12:57:19    Final result by Channing Grissom MD (08/22/19 12:57:19)                 Impression:      No new mobile echogenic foci adherent to the gallbladder wall that could represent adherent stones versus polyps.  The largest 1 measured 0.6 cm.  A call call Thakkar's sign.  Consider surgical consultation for evaluation of adherent echogenic foci.      Electronically signed by: Channing Grissom MD  Date:    08/22/2019  Time:    12:57             Narrative:    EXAMINATION:  US ABDOMEN LIMITED    CLINICAL HISTORY:  RUQ pain, NV;    TECHNIQUE:  Limited ultrasound of the right upper quadrant of the abdomen (including pancreas, liver, gallbladder, common bile duct, and spleen) was performed.    COMPARISON:  None.    FINDINGS:  Liver: Normal in size, measuring 12.1 cm. Homogeneous echotexture. No focal hepatic lesions.    Gallbladder: The gallbladder demonstrates multiple normal well echogenic foci adherent to the wall, the largest 1 measuring 0.6 cm.  This could represent polyps.  Thakkar's sign was equivocal.  There is no gallbladder wall thickening or hyperemia.    Biliary system: The common duct is not dilated, measuring 1 mm.  No intrahepatic ductal dilatation.    Miscellaneous: No upper abdominal ascites.  Visualized right kidney is unremarkable.                                 Medical Decision Making:   Initial Assessment:   33 y/o female with history of gallbladder polyp presenting for evaluation of 1 week history of constant right upper quadrant pain 6/10 associated 1 history of nausea vomiting x5.  Denies hematemesis, diarrhea, melena, hematochezia, urinary symptoms. Diagnosed with gallbladder  polyp 3-4 years ago and no follow up with specialist. P in no distress.  Exam above.  There is right upper quadrant tenderness to palpation. Negative Thakkar sign. No peritoneal signs. UPT atient is afebrile, nontoxicnegative.  UA negative for infection.  CBC without leukocytosis.  Lipase within normal limits.  Right upper quadrant ultrasound remarkable for possible gallbladder polyps versus stones.  Pain moderately improved after Toradol IV.  No longer nauseated after Zofran IV.  She is tolerating p.o. Doubt cholecystitis, choledocholithiasis, cholangitis or acute abdomen at this time. Discussed case with Dr. Recinos who recommends outpatient follow up in clinic. Will have pt follow up in clinic in 2 days and return to ER for worsening symptoms, inability to tolerate PO, fever, worsnening pain or as needed. Discussed pt with Dr. Paz lópez with assessment and plan.                       Clinical Impression:       ICD-10-CM ICD-9-CM   1. RUQ pain R10.11 789.01   2. Nausea and vomiting, intractability of vomiting not specified, unspecified vomiting type R11.2 787.01                                Dyan Skaggs PA-C  08/22/19 1550

## 2019-08-22 NOTE — ED PROVIDER NOTES
Encounter Date: 8/22/2019    SCRIBE #1 NOTE: ISalvatore, am scribing for, and in the presence of, Dyan Skaggs PA-C. Other sections scribed: АЛЕКСАНДР, HEMA.       History     Chief Complaint   Patient presents with    Emesis     pt c/o RUQ pain and vomiting for last few days, diagnosed with UTI last week.      This is a 34 y.o. Female with a PMHx of gallbladder polyp who presents to the ED complaining of N/V x5 that began 1 week ago. Pain rated 6/10,  is constant, radiates to the back and worsens with touch of the abd. Symptoms are worse in the mornings. Also reports RUQ abd pain. Denies hematemesis, diarrhea,  Fever, chills, dysuria, hematuria, frequency, or any other worsening or alleviating factors. PTA the pt was treated for UTI and prescribed to Macrobid. She has currently completed her antibiotics. She normally has 1 BM per day. Last BM was yesterday. She has not taken any medicine PTA. The pt does not smoke cigarettes. Drinks alcohol and smokes marijuana socially. NKDA.    The history is provided by the patient and medical records.     Review of patient's allergies indicates:  No Known Allergies  Past Medical History:   Diagnosis Date    ADD (attention deficit disorder) 4/24/2014    Adderall XR 20mg qd, 5 mg plain in pm, tried Concerta but didn't help, ADD eval on chart 2013    Allergic dermatitis     takes medrol prior to & after getting tatoos bc of hives    Gallbladder polyp     Seborrheic dermatitis of scalp     ketoconazole 2% shampoo helps     Past Surgical History:   Procedure Laterality Date    breast augmentation      CYST REMOVAL       Family History   Problem Relation Age of Onset    Breast cancer Neg Hx     Cancer Neg Hx     Ovarian cancer Neg Hx      Social History     Tobacco Use    Smoking status: Never Smoker    Smokeless tobacco: Never Used   Substance Use Topics    Alcohol use: No     Frequency: Never     Comment: socially    Drug use: Yes     Frequency: 7.0 times per week      Types: Marijuana     Review of Systems   Constitutional: Negative for chills and fever.   HENT: Negative for congestion.    Respiratory: Negative for cough and shortness of breath.    Cardiovascular: Negative for chest pain.   Gastrointestinal: Positive for abdominal pain, nausea and vomiting.   Genitourinary: Negative for dysuria.   Musculoskeletal: Negative for back pain.   Skin: Negative for rash.   Neurological: Negative for dizziness, syncope and light-headedness.   Psychiatric/Behavioral: Negative for confusion.   All other systems reviewed and are negative.      Physical Exam     Initial Vitals [08/22/19 1030]   BP Pulse Resp Temp SpO2   123/82 77 18 98.3 °F (36.8 °C) 99 %      MAP       --         Physical Exam    ED Course   Procedures  Labs Reviewed   COMPREHENSIVE METABOLIC PANEL - Abnormal; Notable for the following components:       Result Value    Alkaline Phosphatase 46 (*)     ALT 8 (*)     Anion Gap 6 (*)     All other components within normal limits   URINALYSIS, REFLEX TO URINE CULTURE - Abnormal; Notable for the following components:    Ketones, UA Trace (*)     All other components within normal limits    Narrative:     In and Out Cath as needed it patient unable to void  Preferred Collection Type->Urine, Clean Catch   CBC W/ AUTO DIFFERENTIAL          Imaging Results    None          Medical Decision Making:   History:   Old Medical Records: I decided to obtain old medical records.  Clinical Tests:   Lab Tests: Ordered and Reviewed            Scribe Attestation:   Scribe #1: I performed the above scribed service and the documentation accurately describes the services I performed. I attest to the accuracy of the note.               Clinical Impression:   No diagnosis found.     I, ***, personally performed the services described in this documentation. All medical record entries made by the scribe were at my direction and in my presence.  I have reviewed the chart and agree that the record  reflects my personal performance and is accurate and complete

## 2019-08-22 NOTE — ED TRIAGE NOTES
Pt reports vomiting for x3 days, right upper quadrant pain and kidney pain.  Pt was dx with UTI at walk in clinic and finished all abx prescribed.

## 2019-09-05 ENCOUNTER — PATIENT OUTREACH (OUTPATIENT)
Dept: ADMINISTRATIVE | Facility: HOSPITAL | Age: 34
End: 2019-09-05

## 2019-11-13 ENCOUNTER — OFFICE VISIT (OUTPATIENT)
Dept: OBSTETRICS AND GYNECOLOGY | Facility: CLINIC | Age: 34
End: 2019-11-13
Payer: OTHER GOVERNMENT

## 2019-11-13 VITALS
DIASTOLIC BLOOD PRESSURE: 64 MMHG | HEIGHT: 59 IN | BODY MASS INDEX: 23.99 KG/M2 | SYSTOLIC BLOOD PRESSURE: 106 MMHG | WEIGHT: 119 LBS

## 2019-11-13 DIAGNOSIS — Z11.3 SCREEN FOR STD (SEXUALLY TRANSMITTED DISEASE): ICD-10-CM

## 2019-11-13 DIAGNOSIS — N94.9 VAGINAL DISCOMFORT: Primary | ICD-10-CM

## 2019-11-13 DIAGNOSIS — N76.0 ACUTE VAGINITIS: ICD-10-CM

## 2019-11-13 LAB
B-HCG UR QL: NEGATIVE
CTP QC/QA: YES

## 2019-11-13 PROCEDURE — 99213 PR OFFICE/OUTPT VISIT, EST, LEVL III, 20-29 MIN: ICD-10-PCS | Mod: S$PBB,,, | Performed by: NURSE PRACTITIONER

## 2019-11-13 PROCEDURE — 87491 CHLMYD TRACH DNA AMP PROBE: CPT

## 2019-11-13 PROCEDURE — 99213 OFFICE O/P EST LOW 20 MIN: CPT | Mod: S$PBB,,, | Performed by: NURSE PRACTITIONER

## 2019-11-13 PROCEDURE — 99213 OFFICE O/P EST LOW 20 MIN: CPT | Mod: PBBFAC | Performed by: NURSE PRACTITIONER

## 2019-11-13 PROCEDURE — 99999 PR PBB SHADOW E&M-EST. PATIENT-LVL III: CPT | Mod: PBBFAC,,, | Performed by: NURSE PRACTITIONER

## 2019-11-13 PROCEDURE — 81025 URINE PREGNANCY TEST: CPT | Mod: PBBFAC | Performed by: NURSE PRACTITIONER

## 2019-11-13 PROCEDURE — 87661 TRICHOMONAS VAGINALIS AMPLIF: CPT

## 2019-11-13 PROCEDURE — 99999 PR PBB SHADOW E&M-EST. PATIENT-LVL III: ICD-10-PCS | Mod: PBBFAC,,, | Performed by: NURSE PRACTITIONER

## 2019-11-13 NOTE — PROGRESS NOTES
CC: Vaginal Discharge    Dyan Ferrara is a 34 y.o. female  presents with complaint of vaginal discharge for 1 week.  She reports itching. She denies odor.  She states the discharge is white and thick.  Alleviating factors: None. No new sexual partners.  She desires STD testing. She reports recently beginning to exercise frequently without immediate shower/change of clothes.      ROS:  GENERAL: No fever, chills, fatigability or weight loss.  VULVAR: No pain, no lesions and no itching.  VAGINAL: No relaxation, itching and discharge, no abnormal bleeding and no lesions.  ABDOMEN: No abdominal pain. Denies nausea. Denies vomiting. No diarrhea. No constipation  BREAST: Denies pain. No lumps. No discharge.  URINARY: No incontinence, no nocturia, no frequency and no dysuria.  CARDIOVASCULAR: No chest pain. No shortness of breath. No leg cramps.  NEUROLOGICAL: No headaches. No vision changes.    PHYSICAL EXAM:  VULVA: normal appearing vulva with no masses, tenderness or lesions   VAGINA: normal appearing vagina with normal color and small amount of thin, white discharge, no lesions   CERVIX: normal appearing cervix without discharge or lesions   UTERUS: uterus is normal size, shape, consistency and nontender   ADNEXA: normal adnexa in size, nontender and no masses    ASSESSMENT and PLAN:    ICD-10-CM ICD-9-CM    1. Vaginal discomfort N94.9 625.9 POCT Urine Pregnancy      Vaginosis Screen by DNA Probe      C. trachomatis/N. gonorrhoeae by AMP DNA [facility]; Cervicovaginal   2. Acute vaginitis N76.0 616.10 Vaginosis Screen by DNA Probe      C. trachomatis/N. gonorrhoeae by AMP DNA [facility]; Cervicovaginal     Affirm/GCCT  STD Blood Panel  Will await result for treatment.    Patient was counseled today on vaginitis prevention including :  a. avoiding feminine products such as deoderant soaps, body wash, bubble bath, douches, scented toilet paper, deoderant tampons or pads, feminine wipes, chronic pad use, etc.  b.  avoiding other vulvovaginal irritants such as long hot baths, humidity, tight, synthetic clothing, chlorine and sitting around in wet bathing suits  c. wearing cotton underwear, avoiding thong underwear and no underwear to bed  d. taking showers instead of baths and use a hair dryer on cool setting afterwards to dry  e. wearing cotton to exercise and shower immediately after exercise and change clothes  f. using polyurethane condoms without spermicide if sexually active and symptoms are triggered by intercourse    FOLLOW UP: PRN lack of improvement.      Leida Ellis, FNP-C

## 2019-11-14 ENCOUNTER — PATIENT MESSAGE (OUTPATIENT)
Dept: OBSTETRICS AND GYNECOLOGY | Facility: CLINIC | Age: 34
End: 2019-11-14

## 2019-11-14 LAB
C TRACH DNA SPEC QL NAA+PROBE: NOT DETECTED
N GONORRHOEA DNA SPEC QL NAA+PROBE: NOT DETECTED

## 2019-11-14 RX ORDER — TINIDAZOLE 500 MG/1
2 TABLET ORAL
Qty: 8 TABLET | Refills: 0 | Status: SHIPPED | OUTPATIENT
Start: 2019-11-14 | End: 2019-11-16

## 2019-11-15 ENCOUNTER — PATIENT MESSAGE (OUTPATIENT)
Dept: OBSTETRICS AND GYNECOLOGY | Facility: CLINIC | Age: 34
End: 2019-11-15

## 2019-11-18 LAB
BACTERIAL VAGINOSIS DNA: ABNORMAL
CANDIDA RRNA VAG QL PROBE: NEGATIVE
G VAGINALIS RRNA GENITAL QL PROBE: POSITIVE
T VAGINALIS RRNA GENITAL QL PROBE: NEGATIVE

## 2019-11-29 ENCOUNTER — NURSE TRIAGE (OUTPATIENT)
Dept: ADMINISTRATIVE | Facility: CLINIC | Age: 34
End: 2019-11-29

## 2019-11-29 ENCOUNTER — PATIENT MESSAGE (OUTPATIENT)
Dept: OBSTETRICS AND GYNECOLOGY | Facility: CLINIC | Age: 34
End: 2019-11-29

## 2019-11-29 NOTE — TELEPHONE ENCOUNTER
Reason for Disposition   Caller requesting a NON-URGENT new prescription or refill and triager unable to refill per unit policy    Protocols used: MEDICATION QUESTION CALL-A-    Patient requesting refill of the medication for bacterial vaginosis. She states the last one worked, but the now the problem has returned. appt made for patient at the [facility] wom[facility] walkin clinic for tomorrow 11/30/19.

## 2019-11-30 RX ORDER — METRONIDAZOLE 500 MG/1
500 TABLET ORAL 2 TIMES DAILY
Qty: 14 TABLET | Refills: 0 | Status: SHIPPED | OUTPATIENT
Start: 2019-11-30 | End: 2019-12-07

## 2019-12-17 ENCOUNTER — TELEPHONE (OUTPATIENT)
Dept: OBSTETRICS AND GYNECOLOGY | Facility: CLINIC | Age: 34
End: 2019-12-17

## 2019-12-17 ENCOUNTER — PATIENT MESSAGE (OUTPATIENT)
Dept: OBSTETRICS AND GYNECOLOGY | Facility: CLINIC | Age: 34
End: 2019-12-17

## 2019-12-17 DIAGNOSIS — N76.1 SUBACUTE VAGINITIS: Primary | ICD-10-CM

## 2019-12-17 RX ORDER — METRONIDAZOLE 7.5 MG/G
1 GEL VAGINAL NIGHTLY
Qty: 70 G | Refills: 2 | Status: SHIPPED | OUTPATIENT
Start: 2019-12-17 | End: 2019-12-22

## 2019-12-17 NOTE — TELEPHONE ENCOUNTER
Patient reports recurrence of BV like symptoms and is requesting medication for treatment. Will prescribe Metrogel with refill. Will refer patient to Dr. Francisco J Vasques III for evaluation of recurrent vaginitis.

## 2019-12-17 NOTE — TELEPHONE ENCOUNTER
----- Message from Neha Connell sent at 12/17/2019  2:58 PM CST -----  Contact: JA JOHNSON [[id number]]  Contact: JA JOHNSON [[id number]]    What is the request in detail:   Requesting a call in regards to getting medication for reoccurring BV infection     Can the clinic reply by [facility]:   No      What Number to Call Back if not in [facility]:  [phone]

## 2019-12-23 ENCOUNTER — TELEPHONE (OUTPATIENT)
Dept: OBSTETRICS AND GYNECOLOGY | Facility: CLINIC | Age: 34
End: 2019-12-23

## 2019-12-23 NOTE — TELEPHONE ENCOUNTER
----- Message from Kely Palma MA sent at 12/19/2019  2:41 PM CST -----  Hi Ms. Seema,    Can you please assist with setting this young lady an appointment with Dr. Vasques please?     ----- Message -----  From: Kely Palma MA  Sent: 12/17/2019   5:19 PM CST  To: Kely Palma MA             Please contact Jessika with Dr. Vasques's office to schedule for vulva clinic for recurrent vaginitis.     Thanks,   Leida

## 2020-03-19 ENCOUNTER — TELEPHONE (OUTPATIENT)
Dept: OBSTETRICS AND GYNECOLOGY | Facility: CLINIC | Age: 35
End: 2020-03-19

## 2020-03-20 ENCOUNTER — TELEPHONE (OUTPATIENT)
Dept: OBSTETRICS AND GYNECOLOGY | Facility: CLINIC | Age: 35
End: 2020-03-20

## 2020-03-20 NOTE — TELEPHONE ENCOUNTER
----- Message from Kandice Su sent at 3/20/2020  2:25 PM CDT -----  Contact: pT    Name of Who is Calling:JA JOHNSON [[id number]]    What is the request in detail: Patient states that March 24, 2020 at 11 am works for her Please contact to further discuss and advise    Can the clinic reply by [facility]: Yes    What [facility]: [phone]

## 2020-03-24 ENCOUNTER — OFFICE VISIT (OUTPATIENT)
Dept: OBSTETRICS AND GYNECOLOGY | Facility: CLINIC | Age: 35
End: 2020-03-24
Attending: OBSTETRICS & GYNECOLOGY
Payer: OTHER GOVERNMENT

## 2020-03-24 VITALS
BODY MASS INDEX: 23.02 KG/M2 | HEIGHT: 59 IN | DIASTOLIC BLOOD PRESSURE: 70 MMHG | WEIGHT: 114.19 LBS | SYSTOLIC BLOOD PRESSURE: 122 MMHG

## 2020-03-24 DIAGNOSIS — B37.31 CANDIDIASIS OF VULVA AND VAGINA: ICD-10-CM

## 2020-03-24 DIAGNOSIS — R39.89 SUSPECTED UTI: Primary | ICD-10-CM

## 2020-03-24 PROCEDURE — 87102 FUNGUS ISOLATION CULTURE: CPT

## 2020-03-24 PROCEDURE — 99999 PR PBB SHADOW E&M-EST. PATIENT-LVL III: CPT | Mod: PBBFAC,,, | Performed by: OBSTETRICS & GYNECOLOGY

## 2020-03-24 PROCEDURE — 99214 PR OFFICE/OUTPT VISIT, EST, LEVL IV, 30-39 MIN: ICD-10-PCS | Mod: S$PBB,,, | Performed by: OBSTETRICS & GYNECOLOGY

## 2020-03-24 PROCEDURE — 99213 OFFICE O/P EST LOW 20 MIN: CPT | Mod: PBBFAC | Performed by: OBSTETRICS & GYNECOLOGY

## 2020-03-24 PROCEDURE — 99214 OFFICE O/P EST MOD 30 MIN: CPT | Mod: S$PBB,,, | Performed by: OBSTETRICS & GYNECOLOGY

## 2020-03-24 PROCEDURE — 87106 FUNGI IDENTIFICATION YEAST: CPT

## 2020-03-24 PROCEDURE — 99999 PR PBB SHADOW E&M-EST. PATIENT-LVL III: ICD-10-PCS | Mod: PBBFAC,,, | Performed by: OBSTETRICS & GYNECOLOGY

## 2020-03-24 PROCEDURE — 87086 URINE CULTURE/COLONY COUNT: CPT

## 2020-03-24 RX ORDER — ESCITALOPRAM OXALATE 10 MG/1
10 TABLET ORAL DAILY
COMMUNITY
Start: 2020-02-20 | End: 2021-06-30

## 2020-03-24 NOTE — PROGRESS NOTES
Subjective:     Patient ID: Dyan Ferrara is a 34 y.o. female.     Chief Complaint: Vaginal Discharge (ref DEQUAN Ellis NP, odor with and without discharge); Vulvar Itch; and Dysuria (strong smell to urine)     History of Present Illness: This patient is a 34 y.o. female, who presents to the GYN Vulva clinic for evaluation of vulvar itching and occasional vaginal discharge with odor  She was seen in November complaining of a discharge and itching for 1 week.  She states that the discharge with an odor most commonly occurs with an odor at the time of her menses.  Her menses has started today.         Patient's last menstrual period was 02/28/2020 (exact date).    Review of Systems    GENERAL: No fever, chills, fatigability or weightchange  SKIN: No rashes, itching or changes in color or texture of skin.  HEAD: No headaches or recent head trauma.  EYES: Visual acuity fine. No photophobia,r diplopia.  EARS: Denies earache or vertigo  NOSE: No loss of smell, no epistaxis or postnasal drip.  MOUTH & THROAT: No hoarseness or change in voice.   NODES: Denies swollen glands.  CHEST: Denies IRWIN, cyanosis, wheezing, cough and sputum production.  CARDIOVASCULAR: Denies chest pain, PND, orthopnea or reduced exercise tolerance.  ABDOMEN: Appetite fine. No weight loss. bloating, Denies diarrhea, abdominal pain, hematemesis or blood in stool.  URINARY: No flank pain, dysuria or hematuria.  PERIPHERAL VASCULAR: No claudication or cyanosis.Varicosities  MUSCULOSKELETAL: No joint stiffness or swelling. Denies back pain.muscle aches  NEUROLOGIC: No history of seizures, paralysis, alteration of gait or coordination.       Objective:       Physical Exam     APPEARANCE: Well nourished, well developed, in no acute distress.    GENITOURINARY:  Vulva: No lesions. Normal female genital architecture.   Urethral Meatus: Normal size and location, no lesions, no prolapse.  Urethra: No masses, tenderness, prolapse or scarring.  Vagina: rugae, no  discharge, no significant cystocele or rectocele.  Cervix: No lesions, normal diameter, no stenosis, no cervical motion tenderness. .  Uterus: 6 week size, regular shape, mobile, non-tender, normal position, good support.  Adnexa: No masses, tenderness or CDS nodularity.  Anus Perineum: No lesions, no relaxation, no external hemorrhoids.  Abdomen: No masses, tenderness, hernia or ascites, no hepatasplenomegaly  Skin: No rashes, lesions, ulcers, acne, hirsutism.  Peripheral/lower extremities: No edema, erythema or tenderness.  Lymphatic: No axillary, neck or groin nodes palp.  Mental Status: Alert, oriented x 3, normal affect and mood.          @PROCEDURE:@  Wet Prep:Not done 2nd to Menses            p     Assessment:      1. Suspected UTI    2. Candidiasis of vulva and vagina               Plan:  1.  Candida culture of the vagina taken today.  2.  Return to clinic if in when patient develops vaginal discharge with itching or odor.                      Orders Placed This Encounter   Procedures    Urine culture    Fungus culture

## 2020-03-25 LAB — BACTERIA UR CULT: NORMAL

## 2020-04-07 DIAGNOSIS — B37.9 CANDIDA ALBICANS INFECTION: Primary | ICD-10-CM

## 2020-04-07 RX ORDER — FLUCONAZOLE 200 MG/1
TABLET ORAL
Qty: 3 TABLET | Refills: 0 | Status: SHIPPED | OUTPATIENT
Start: 2020-04-07 | End: 2021-06-30

## 2020-04-07 NOTE — TELEPHONE ENCOUNTER
----- Message from Francisco J Vasques III, MD sent at 4/7/2020 12:19 PM CDT -----  Notify the patient that her Candida culture was positive.  Treated with Diflucan 200 mg, dispense 3 tablet, she is to take one tablet every 3rd day for 3 doses.

## 2020-04-20 ENCOUNTER — HOSPITAL ENCOUNTER (EMERGENCY)
Facility: HOSPITAL | Age: 35
Discharge: HOME | End: 2020-04-20
Attending: EMERGENCY MEDICINE
Payer: OTHER GOVERNMENT

## 2020-04-20 VITALS
OXYGEN SATURATION: 99 % | BODY MASS INDEX: 22.18 KG/M2 | HEART RATE: 63 BPM | HEIGHT: 59 IN | TEMPERATURE: 98 F | SYSTOLIC BLOOD PRESSURE: 123 MMHG | RESPIRATION RATE: 18 BRPM | WEIGHT: 110 LBS | DIASTOLIC BLOOD PRESSURE: 73 MMHG

## 2020-04-20 DIAGNOSIS — R82.90 MALODOROUS URINE: ICD-10-CM

## 2020-04-20 DIAGNOSIS — R68.84 JAW PAIN: Primary | ICD-10-CM

## 2020-04-20 LAB
B-HCG UR QL: NEGATIVE
BILIRUB UR QL STRIP: NEGATIVE
CLARITY UR: CLEAR
COLOR UR: YELLOW
CTP QC/QA: YES
GLUCOSE UR QL STRIP: NEGATIVE
HGB UR QL STRIP: NEGATIVE
KETONES UR QL STRIP: NEGATIVE
LEUKOCYTE ESTERASE UR QL STRIP: NEGATIVE
NITRITE UR QL STRIP: NEGATIVE
PH UR STRIP: 6 [PH] (ref 5–8)
PROT UR QL STRIP: NEGATIVE
SP GR UR STRIP: 1.01 (ref 1–1.03)
URN SPEC COLLECT METH UR: NORMAL
UROBILINOGEN UR STRIP-ACNC: NEGATIVE EU/DL

## 2020-04-20 PROCEDURE — 81003 URINALYSIS AUTO W/O SCOPE: CPT

## 2020-04-20 PROCEDURE — 99284 EMERGENCY DEPT VISIT MOD MDM: CPT | Mod: 25

## 2020-04-20 PROCEDURE — 81025 URINE PREGNANCY TEST: CPT | Performed by: NURSE PRACTITIONER

## 2020-04-20 PROCEDURE — 63600175 PHARM REV CODE 636 W HCPCS: Performed by: NURSE PRACTITIONER

## 2020-04-20 PROCEDURE — 96372 THER/PROPH/DIAG INJ SC/IM: CPT

## 2020-04-20 RX ORDER — NAPROXEN 500 MG/1
500 TABLET ORAL 2 TIMES DAILY PRN
Qty: 20 TABLET | Refills: 0 | Status: SHIPPED | OUTPATIENT
Start: 2020-04-20 | End: 2020-04-25

## 2020-04-20 RX ORDER — CYCLOBENZAPRINE HCL 10 MG
10 TABLET ORAL 3 TIMES DAILY PRN
Qty: 15 TABLET | Refills: 0 | Status: SHIPPED | OUTPATIENT
Start: 2020-04-20 | End: 2020-04-25

## 2020-04-20 RX ORDER — KETOROLAC TROMETHAMINE 30 MG/ML
30 INJECTION, SOLUTION INTRAMUSCULAR; INTRAVENOUS
Status: COMPLETED | OUTPATIENT
Start: 2020-04-20 | End: 2020-04-20

## 2020-04-20 RX ADMIN — KETOROLAC TROMETHAMINE 30 MG: 30 INJECTION, SOLUTION INTRAMUSCULAR at 08:04
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
## 2020-04-20 NOTE — DISCHARGE INSTRUCTIONS
Writer You have been prescribed FLEXERIL for pain. Please do not take this medication while working, drinking alcohol, swimming, or while driving/operating heavy machinery. This medication may cause drowsiness, impair judgment, and reduce physical capabilities.    You have been prescribed Naproxen for pain. This is an Non-Steroidal Anti-Inflammatory (NSAID) Medication. Please do not take any additional NSAIDs while you are taking this medication including (Advil, Aleve, Motrin, Ibuprofen, Mobic\meloxicam, Naprosyn, etc.). Please stop taking this medication if you experience: weakness, itching, yellow skin or eyes, joint pains, vomiting blood, blood or black stools, unusual weight gain, or swelling in your arms, legs, hands, or feet.     Please return to the Emergency Department for any new or worsening symptoms including: fever, chest pain, shortness of breath, loss of consciousness, dizziness, weakness, or any other concerns.     Please follow up with your Primary Care Provider within in the week. If you do not have one, you may contact the one listed on your discharge paperwork or you may also call the [facility] Appointment Desk at [phone] to schedule an appointment with one.     Please take all medication as prescribed.

## 2020-04-27 LAB — FUNGUS SPEC CULT: ABNORMAL

## 2020-05-18 ENCOUNTER — TELEPHONE (OUTPATIENT)
Dept: OBSTETRICS AND GYNECOLOGY | Facility: CLINIC | Age: 35
End: 2020-05-18

## 2020-05-18 ENCOUNTER — PATIENT MESSAGE (OUTPATIENT)
Dept: OBSTETRICS AND GYNECOLOGY | Facility: CLINIC | Age: 35
End: 2020-05-18

## 2020-05-18 NOTE — TELEPHONE ENCOUNTER
----- Message from Annetta Gisbon sent at 5/18/2020  1:45 PM CDT -----  Contact: JA JOHNSON [[id number]]  Type:  Patient Returning Call    Who Called: JA JOHNSON [[id number]]    Who Left Message for Patient: unknown    Does the patient know what this is regarding?: unknown    Can the clinic reply in [facility]: No    Best Call Back Number: [phone]    Additional Information: N/A

## 2020-05-19 ENCOUNTER — PATIENT MESSAGE (OUTPATIENT)
Dept: OBSTETRICS AND GYNECOLOGY | Facility: CLINIC | Age: 35
End: 2020-05-19

## 2020-05-19 ENCOUNTER — TELEPHONE (OUTPATIENT)
Dept: OBSTETRICS AND GYNECOLOGY | Facility: CLINIC | Age: 35
End: 2020-05-19

## 2020-05-19 NOTE — TELEPHONE ENCOUNTER
----- Message from Jana Oconnor LPN sent at 5/18/2020  3:37 PM CDT -----  05/18/2020  I've been trying to return a call regarding my labs but I keep missing the phone calls.

## 2020-12-16 ENCOUNTER — HOSPITAL ENCOUNTER (EMERGENCY)
Facility: HOSPITAL | Age: 35
Discharge: HOME | End: 2020-12-16
Attending: EMERGENCY MEDICINE
Payer: OTHER GOVERNMENT

## 2020-12-16 VITALS
HEART RATE: 78 BPM | DIASTOLIC BLOOD PRESSURE: 61 MMHG | OXYGEN SATURATION: 100 % | RESPIRATION RATE: 18 BRPM | HEIGHT: 59 IN | SYSTOLIC BLOOD PRESSURE: 112 MMHG | BODY MASS INDEX: 22.18 KG/M2 | TEMPERATURE: 99 F | WEIGHT: 110 LBS

## 2020-12-16 DIAGNOSIS — S61.213A LACERATION OF LEFT MIDDLE FINGER WITHOUT FOREIGN BODY WITHOUT DAMAGE TO NAIL, INITIAL ENCOUNTER: ICD-10-CM
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
## 2020-12-16 DIAGNOSIS — S61.512A LACERATION OF LEFT WRIST, INITIAL ENCOUNTER: ICD-10-CM

## 2020-12-16 DIAGNOSIS — M25.532 LEFT WRIST PAIN: ICD-10-CM

## 2020-12-16 LAB
B-HCG UR QL: NEGATIVE
CTP QC/QA: YES

## 2020-12-16 PROCEDURE — 81025 URINE PREGNANCY TEST: CPT | Performed by: EMERGENCY MEDICINE

## 2020-12-16 PROCEDURE — 25000003 PHARM REV CODE 250: Performed by: NURSE PRACTITIONER

## 2020-12-16 PROCEDURE — 12002 RPR S/N/AX/GEN/TRNK2.6-7.5CM: CPT

## 2020-12-16 PROCEDURE — 99284 EMERGENCY DEPT VISIT MOD MDM: CPT | Mod: 25

## 2020-12-16 PROCEDURE — 29125 APPL SHORT ARM SPLINT STATIC: CPT | Mod: LT

## 2020-12-16 RX ORDER — HYDROCODONE BITARTRATE AND ACETAMINOPHEN 5; 325 MG/1; MG/1
1 TABLET ORAL
Status: COMPLETED | OUTPATIENT
Start: 2020-12-16 | End: 2020-12-16

## 2020-12-16 RX ORDER — LIDOCAINE HYDROCHLORIDE 10 MG/ML
10 INJECTION INFILTRATION; PERINEURAL
Status: COMPLETED | OUTPATIENT
Start: 2020-12-16 | End: 2020-12-16

## 2020-12-16 RX ORDER — AMOXICILLIN AND CLAVULANATE POTASSIUM 875; 125 MG/1; MG/1
1 TABLET, FILM COATED ORAL
Status: COMPLETED | OUTPATIENT
Start: 2020-12-16 | End: 2020-12-16

## 2020-12-16 RX ORDER — HYDROCODONE BITARTRATE AND ACETAMINOPHEN 5; 325 MG/1; MG/1
1 TABLET ORAL EVERY 4 HOURS PRN
Qty: 10 TABLET | Refills: 0 | Status: SHIPPED | OUTPATIENT
Start: 2020-12-16 | End: 2021-06-30

## 2020-12-16 RX ORDER — BACITRACIN 500 [USP'U]/G
OINTMENT TOPICAL
Status: COMPLETED | OUTPATIENT
Start: 2020-12-16 | End: 2020-12-16

## 2020-12-16 RX ORDER — MUPIROCIN 20 MG/G
OINTMENT TOPICAL 3 TIMES DAILY
Qty: 15 G | Refills: 0 | Status: SHIPPED | OUTPATIENT
Start: 2020-12-16 | End: 2021-06-30

## 2020-12-16 RX ORDER — AMOXICILLIN AND CLAVULANATE POTASSIUM 875; 125 MG/1; MG/1
1 TABLET, FILM COATED ORAL 2 TIMES DAILY
Qty: 14 TABLET | Refills: 0 | Status: SHIPPED | OUTPATIENT
Start: 2020-12-16 | End: 2021-06-30

## 2020-12-16 RX ORDER — IBUPROFEN 600 MG/1
600 TABLET ORAL EVERY 6 HOURS PRN
Qty: 20 TABLET | Refills: 0 | Status: SHIPPED | OUTPATIENT
Start: 2020-12-16 | End: 2021-06-30

## 2020-12-16 RX ADMIN — AMOXICILLIN AND CLAVULANATE POTASSIUM 1 TABLET: 875; 125 TABLET, FILM COATED ORAL at 07:12

## 2020-12-16 RX ADMIN — HYDROCODONE BITARTRATE AND ACETAMINOPHEN 1 TABLET: 5; 325 TABLET ORAL at 07:12

## 2020-12-16 RX ADMIN — BACITRACIN: 500 OINTMENT TOPICAL at 07:12

## 2020-12-16 RX ADMIN — LIDOCAINE HYDROCHLORIDE 10 ML: 10 INJECTION, SOLUTION INFILTRATION; PERINEURAL at 07:12

## 2020-12-17 NOTE — DISCHARGE INSTRUCTIONS
You have been prescribed NORCO for pain. Please do not take this medication while working, drinking alcohol, swimming, or while driving/operating heavy machinery. This medication may cause drowsiness, impair judgment, and reduce physical capabilities.    You have been prescribed ibuprofeen for pain. This is an Non-Steroidal Anti-Inflammatory (NSAID) Medication. Please do not take any additional NSAIDs while you are taking this medication including (Advil, Aleve, Motrin, Ibuprofen, Mobic\meloxicam, Naprosyn, etc.). Please stop taking this medication if you experience: weakness, itching, yellow skin or eyes, joint pains, vomiting blood, blood or black stools, unusual weight gain, or swelling in your arms, legs, hands, or feet.     Please keep your wound clean and dry.  Wash gently with soap and water and apply antibiotic ointment (bacitracin, neosporin, etc.) over the wound after washing. Please watch for signs of infection including: increased\spreading redness, swelling, pus-like discharge, or a fever greater than 100.4F. If you experience any of these, please contact your Primary Care Doctor or Return to the Emergency Department for a wound check.     Please follow up with your Primary Care Doctor in 10-14 days for wound recheck and suture removal.  You may return to the Emergency Department if you are unable to see your Primary Care Doctor.  Please return to the ER for any new or worsening symptoms.

## 2020-12-17 NOTE — ED PROVIDER NOTES
Encounter Date: 12/16/2020       History     Chief Complaint   Patient presents with    Animal Bite     left hand dog bite PTA      CC:  Dog bite    HPI:  This is a 35-year-old female presenting to the ED with left hand and wrist pain after being bitten while trying to break up a fight between her two pet pit bulls dogs.  Injury occurred prior to arrival.  No attempted treatment prior to arrival.  Last tetanus shot was in 2018.  She is right-hand dominant.  Dogs are vaccinated.    The history is provided by the patient. No  was used.     Review of patient's allergies indicates:  No Known Allergies  Past Medical History:   Diagnosis Date    ADD (attention deficit disorder) 4/24/2014    Adderall XR 20mg qd, 5 mg plain in pm, tried Concerta but didn't help, ADD eval on chart 2013    Allergic dermatitis     takes medrol prior to & after getting tatoos bc of hives    Gallbladder polyp     Seborrheic dermatitis of scalp     ketoconazole 2% shampoo helps     Past Surgical History:   Procedure Laterality Date    breast augmentation      CYST REMOVAL       Family History   Problem Relation Age of Onset    Breast cancer Neg Hx     Cancer Neg Hx     Ovarian cancer Neg Hx      Social History     Tobacco Use    Smoking status: Never Smoker    Smokeless tobacco: Never Used   Substance Use Topics    Alcohol use: No     Frequency: Never     Comment: socially    Drug use: Yes     Frequency: 7.0 times per week     Types: Marijuana     Review of Systems   Constitutional: Negative for chills and fever.   HENT: Negative for sore throat.    Respiratory: Negative for shortness of breath.    Cardiovascular: Negative for chest pain.   Gastrointestinal: Negative for nausea.   Genitourinary: Negative for dysuria.   Musculoskeletal: Positive for arthralgias. Negative for back pain.   Skin: Positive for wound. Negative for rash.   Neurological: Negative for weakness.   Hematological: Does not bruise/bleed  "easily.       Physical Exam     Initial Vitals [12/16/20 1830]   BP Pulse Resp Temp SpO2   108/60 85 16 98.1 °F (36.7 °C) 100 %      MAP       --         Physical Exam    Constitutional: She appears well-developed and well-nourished. She is not diaphoretic. No distress.   HENT:   Head: Normocephalic and atraumatic.   Neck: Normal range of motion.   Cardiovascular: Normal rate, regular rhythm, normal heart sounds and intact distal pulses.   Pulmonary/Chest: No respiratory distress.   Musculoskeletal: Normal range of motion.      Left wrist: She exhibits tenderness and laceration.        Left hand: She exhibits tenderness and laceration.      Comments: Left hand middle digit with a 1cm linear laceration overlying the dorsal proximal phalanx. "v-shaped" 3.5 cm laceration over the dorsal left wrist.    Neurological: She is alert and oriented to person, place, and time.   Skin: Skin is warm and dry.   Psychiatric: She has a normal mood and affect. Her behavior is normal.         ED Course   Lac Repair    Date/Time: 12/16/2020 8:52 PM  Performed by: Whit Evans NP  Authorized by: Denis Butler MD     Consent:     Consent given by:  Patient    Risks discussed:  Infection, pain, tendon damage, vascular damage, poor wound healing, nerve damage, need for additional repair and poor cosmetic result    Alternatives discussed:  No treatment, delayed treatment and observation  Anesthesia (see MAR for exact dosages):     Anesthesia method:  Local infiltration    Local anesthetic:  Lidocaine 1% w/o epi  Laceration details:     Location:  Finger    Finger location:  L long finger    Length (cm):  1    Depth (mm):  1  Repair type:     Repair type:  Simple  Pre-procedure details:     Preparation:  Patient was prepped and draped in usual sterile fashion  Exploration:     Wound exploration: wound explored through full range of motion and entire depth of wound probed and visualized      Wound extent: no foreign bodies/material " noted, no muscle damage noted, no nerve damage noted, no tendon damage noted, no underlying fracture noted and no vascular damage noted      Contaminated: no    Treatment:     Area cleansed with:  Saline    Amount of cleaning:  Standard    Irrigation solution:  Sterile saline    Irrigation method:  Pressure wash  Skin repair:     Repair method:  Sutures    Suture size:  4-0    Suture material:  Prolene    Number of sutures:  1  Approximation:     Approximation:  Close  Post-procedure details:     Dressing:  Antibiotic ointment, bulky dressing and splint for protection    Patient tolerance of procedure:  Tolerated well, no immediate complications    Lac Repair    Date/Time: 12/16/2020 8:54 PM  Performed by: Whit Evans NP  Authorized by: Denis Butler MD     Consent:     Consent given by:  Patient    Risks discussed:  Infection, need for additional repair, nerve damage, poor wound healing, poor cosmetic result, pain, vascular damage, tendon damage and retained foreign body    Alternatives discussed:  No treatment, delayed treatment and observation  Anesthesia (see MAR for exact dosages):     Anesthesia method:  Local infiltration    Local anesthetic:  Lidocaine 1% w/o epi  Laceration details:     Location:  Hand    Hand location:  L wrist    Length (cm):  3.5  Repair type:     Repair type:  Simple  Pre-procedure details:     Preparation:  Patient was prepped and draped in usual sterile fashion  Exploration:     Wound exploration: wound explored through full range of motion and entire depth of wound probed and visualized      Wound extent: no areolar tissue violation noted, no fascia violation noted, no foreign bodies/material noted, no muscle damage noted, no nerve damage noted, no tendon damage noted, no underlying fracture noted and no vascular damage noted      Contaminated: no    Treatment:     Area cleansed with:  Saline    Amount of cleaning:  Standard    Irrigation solution:  Sterile saline     Irrigation method:  Pressure wash  Skin repair:     Repair method:  Sutures    Suture size:  4-0    Suture material:  Prolene    Suture technique:  Simple interrupted    Number of sutures:  3  Approximation:     Approximation:  Close  Post-procedure details:     Dressing:  Antibiotic ointment, bulky dressing and splint for protection    Patient tolerance of procedure:  Tolerated well, no immediate complications  Splint Application    Date/Time: 12/16/2020 8:55 PM  Performed by: Whit Evans NP  Authorized by: Denis Butler MD   Location details: left wrist  Splint type: volar short arm (Velcro)  Post-procedure: The splinted body part was neurovascularly unchanged following the procedure.  Patient tolerance: Patient tolerated the procedure well with no immediate complications    Splint Application    Date/Time: 12/16/2020 8:55 PM  Performed by: Whit Evans NP  Authorized by: Denis Butler MD   Location details: left long finger  Splint type: static finger  Supplies used: aluminum splint  Post-procedure: The splinted body part was neurovascularly unchanged following the procedure.  Patient tolerance: Patient tolerated the procedure well with no immediate complications        Labs Reviewed   POCT URINE PREGNANCY          Imaging Results          X-Ray Hand 3 view Left (Final result)  Result time 12/16/20 19:37:23    Final result by Saba Trevino MD (12/16/20 19:37:23)                 Impression:      No acute osseous abnormality identified.      Electronically signed by: Saba Trevino MD  Date:    12/16/2020  Time:    19:37             Narrative:    EXAMINATION:  XR WRIST COMPLETE 3 VIEWS LEFT; XR HAND COMPLETE 3 VIEW LEFT    CLINICAL HISTORY:  left hand pain; Pain in left wrist    TECHNIQUE:  PA, lateral, and oblique views of the left wrist were performed.  Left hand three views.    COMPARISON:  September 2018.    FINDINGS:  No evidence of acute displaced fracture, dislocation, or osseous  destructive process.  No radiopaque retained foreign body seen.                               X-Ray Wrist Complete Left (Final result)  Result time 12/16/20 19:37:23    Final result by Saba Trevino MD (12/16/20 19:37:23)                 Impression:      No acute osseous abnormality identified.      Electronically signed by: Saba Trevino MD  Date:    12/16/2020  Time:    19:37             Narrative:    EXAMINATION:  XR WRIST COMPLETE 3 VIEWS LEFT; XR HAND COMPLETE 3 VIEW LEFT    CLINICAL HISTORY:  left hand pain; Pain in left wrist    TECHNIQUE:  PA, lateral, and oblique views of the left wrist were performed.  Left hand three views.    COMPARISON:  September 2018.    FINDINGS:  No evidence of acute displaced fracture, dislocation, or osseous destructive process.  No radiopaque retained foreign body seen.                                 Medical Decision Making:   ED Management:  This is an evaluation of a 35 y.o. female that presents to the Emergency Department for lacerations sustained from a dog bite. Physical Exam shows a non-toxic, afebrile, and well appearing female. There is a laceration to left middle digit.  FDS and FDP intact against resistance.  Digit with brisk capillary refill.  There is also laceration to the dorsal left wrist.  Full range of motion.  Ulnar and radial nerves intact.. There is no surrounding erythema or area of increased warmth.  Arm and hand compartments are soft. There is full ROM of hand and digits against resistance. Equal sensation to the extremity. No visible tendon lacerations observed on exam.  The wound was irrigated and visually inspected prior to closure. No visible foreign bodies noted. Vital Signs Are Reassuring. Tetanus is up to date.     RESULTS:  X-rays are negative for fracture, dislocation, foreign body. The wound was closed per the procedure note.     My overall impression is Laceration. I considered, but at this time, do not suspect cellulitis, compartment  syndrome, underlying fracture, endon injury, or suspect any retained foreign body at this time.     Will place patient on Augmentin for prophylaxis.  Encourage close monitoring of the wounds for signs and symptoms of infection.  Patient verbalized understanding.    D/C Information: Laceration/Wound Care/Suture Removal instructions given. The diagnosis, treatment plan, instructions for follow-up and reevaluation with her PCP or Return to ED in 10-14 days for suture removal as well as ED return precautions were discussed and understanding was verbalized. All questions or concerns have been addressed.                              Clinical Impression:       ICD-10-CM ICD-9-CM   1. Dog bite, initial encounter  W54.0XXA 879.8     E906.0   2. Left wrist pain  M25.532 719.43   3. Laceration of left middle finger without foreign body without damage to nail, initial encounter  S61.213A 883.0   4. Laceration of left wrist, initial encounter  S61.512A 881.02                      Disposition:   Disposition: Discharged  Condition: Stable     ED Disposition Condition    Discharge Stable        ED Prescriptions     Medication Sig Dispense Start Date End Date Auth. Provider    amoxicillin-clavulanate 875-125mg (AUGMENTIN) 875-125 mg per tablet Take 1 tablet by mouth 2 (two) times daily. 14 tablet 12/16/2020  Whit Evans NP    mupirocin (BACTROBAN) 2 % ointment Apply topically 3 (three) times daily. 15 g 12/16/2020  Whit Evans NP    ibuprofen (ADVIL,MOTRIN) 600 MG tablet Take 1 tablet (600 mg total) by mouth every 6 (six) hours as needed for Pain. 20 tablet 12/16/2020  Whit Evans NP    HYDROcodone-acetaminophen (NORCO) 5-325 mg per tablet Take 1 tablet by mouth every 4 (four) hours as needed. 10 tablet 12/16/2020  Whit Evans NP        Follow-up Information     Follow up With Specialties Details Why Contact Info    Lacy Silva MD Family Medicine Schedule an appointment as soon as possible for a visit   For follow-up [address] [facility]  [address]  [facility] [facility] [address]  [phone]      [facility] Go in 14 days For suture removal [address]  [facility] [address] [phone]    [facility] Go to  If symptoms worsen [address]  [facility] [address] [phone]                                       Whit Evans NP  12/16/20 2057

## 2020-12-17 NOTE — ED TRIAGE NOTES
Pt presents to the ED via personal transportation reporting she was bit by her dog just PTA when she was trying to break up a fight between both her dogs. Pt has a bite noted to her left wrist and a cut to her left middle finger. Pt reports her tetanus is up to date. Pt in NAD.

## 2021-04-26 ENCOUNTER — PATIENT MESSAGE (OUTPATIENT)
Dept: RESEARCH | Facility: HOSPITAL | Age: 36
End: 2021-04-26

## 2021-06-30 ENCOUNTER — OFFICE VISIT (OUTPATIENT)
Dept: OBSTETRICS AND GYNECOLOGY | Facility: CLINIC | Age: 36
End: 2021-06-30
Attending: OBSTETRICS & GYNECOLOGY
Payer: OTHER GOVERNMENT

## 2021-06-30 VITALS
SYSTOLIC BLOOD PRESSURE: 112 MMHG | HEIGHT: 59 IN | WEIGHT: 131.75 LBS | DIASTOLIC BLOOD PRESSURE: 60 MMHG | BODY MASS INDEX: 26.56 KG/M2

## 2021-06-30 DIAGNOSIS — N93.0 BLEEDING AFTER INTERCOURSE: Primary | ICD-10-CM

## 2021-06-30 DIAGNOSIS — Z01.411 ENCOUNTER FOR GYNECOLOGICAL EXAMINATION (GENERAL) (ROUTINE) WITH ABNORMAL FINDINGS: ICD-10-CM

## 2021-06-30 PROCEDURE — 99395 PR PREVENTIVE VISIT,EST,18-39: ICD-10-PCS | Mod: S$PBB,,, | Performed by: OBSTETRICS & GYNECOLOGY

## 2021-06-30 PROCEDURE — 99213 OFFICE O/P EST LOW 20 MIN: CPT | Mod: PBBFAC | Performed by: OBSTETRICS & GYNECOLOGY

## 2021-06-30 PROCEDURE — 87491 CHLMYD TRACH DNA AMP PROBE: CPT | Performed by: OBSTETRICS & GYNECOLOGY

## 2021-06-30 PROCEDURE — 99999 PR PBB SHADOW E&M-EST. PATIENT-LVL III: ICD-10-PCS | Mod: PBBFAC,,, | Performed by: OBSTETRICS & GYNECOLOGY

## 2021-06-30 PROCEDURE — 87591 N.GONORRHOEAE DNA AMP PROB: CPT | Mod: 59 | Performed by: OBSTETRICS & GYNECOLOGY

## 2021-06-30 PROCEDURE — 99999 PR PBB SHADOW E&M-EST. PATIENT-LVL III: CPT | Mod: PBBFAC,,, | Performed by: OBSTETRICS & GYNECOLOGY

## 2021-06-30 PROCEDURE — 87481 CANDIDA DNA AMP PROBE: CPT | Mod: 59 | Performed by: OBSTETRICS & GYNECOLOGY

## 2021-06-30 PROCEDURE — 99395 PREV VISIT EST AGE 18-39: CPT | Mod: S$PBB,,, | Performed by: OBSTETRICS & GYNECOLOGY

## 2021-06-30 PROCEDURE — 87661 TRICHOMONAS VAGINALIS AMPLIF: CPT | Mod: 59 | Performed by: OBSTETRICS & GYNECOLOGY

## 2021-06-30 RX ORDER — DEXTROAMPHETAMINE SACCHARATE, AMPHETAMINE ASPARTATE, DEXTROAMPHETAMINE SULFATE AND AMPHETAMINE SULFATE 7.5; 7.5; 7.5; 7.5 MG/1; MG/1; MG/1; MG/1
1 TABLET ORAL DAILY
COMMUNITY
Start: 2021-06-02

## 2021-06-30 RX ORDER — DEXTROAMPHETAMINE SACCHARATE, AMPHETAMINE ASPARTATE, DEXTROAMPHETAMINE SULFATE AND AMPHETAMINE SULFATE 2.5; 2.5; 2.5; 2.5 MG/1; MG/1; MG/1; MG/1
1 TABLET ORAL DAILY
COMMUNITY
Start: 2021-06-02

## 2021-07-02 LAB
BACTERIAL VAGINOSIS DNA: NEGATIVE
CANDIDA GLABRATA DNA: NEGATIVE
CANDIDA KRUSEI DNA: NEGATIVE
CANDIDA RRNA VAG QL PROBE: NEGATIVE
T VAGINALIS RRNA GENITAL QL PROBE: NEGATIVE

## 2021-07-03 LAB
C TRACH DNA SPEC QL NAA+PROBE: NOT DETECTED
N GONORRHOEA DNA SPEC QL NAA+PROBE: NOT DETECTED

## 2022-06-19 NOTE — ADDENDUM NOTE
Addended by: PEYTON HAWTHORNE on: 4/1/2019 06:38 PM     Modules accepted: Orders    
I was present during the key portion of the procedure.

## 2022-10-31 ENCOUNTER — OFFICE VISIT (OUTPATIENT)
Dept: OBSTETRICS AND GYNECOLOGY | Facility: CLINIC | Age: 37
End: 2022-10-31
Payer: COMMERCIAL

## 2022-10-31 ENCOUNTER — LAB VISIT (OUTPATIENT)
Dept: LAB | Facility: HOSPITAL | Age: 37
End: 2022-10-31
Attending: NURSE PRACTITIONER
Payer: COMMERCIAL

## 2022-10-31 VITALS
BODY MASS INDEX: 23.56 KG/M2 | DIASTOLIC BLOOD PRESSURE: 73 MMHG | WEIGHT: 116.63 LBS | SYSTOLIC BLOOD PRESSURE: 111 MMHG

## 2022-10-31 DIAGNOSIS — Z11.3 SCREENING EXAMINATION FOR SEXUALLY TRANSMITTED DISEASE: ICD-10-CM

## 2022-10-31 DIAGNOSIS — Z12.4 ENCOUNTER FOR PAPANICOLAOU SMEAR FOR CERVICAL CANCER SCREENING: ICD-10-CM

## 2022-10-31 DIAGNOSIS — Z01.419 WELL WOMAN EXAM: Primary | ICD-10-CM

## 2022-10-31 DIAGNOSIS — N76.0 ACUTE VAGINITIS: ICD-10-CM

## 2022-10-31 LAB
HAV IGM SERPL QL IA: NORMAL
HBV CORE IGM SERPL QL IA: NORMAL
HBV SURFACE AG SERPL QL IA: NORMAL
HCV AB SERPL QL IA: NORMAL
HIV 1+2 AB+HIV1 P24 AG SERPL QL IA: NORMAL

## 2022-10-31 PROCEDURE — 1160F RVW MEDS BY RX/DR IN RCRD: CPT | Mod: CPTII,S$GLB,, | Performed by: NURSE PRACTITIONER

## 2022-10-31 PROCEDURE — 87591 N.GONORRHOEAE DNA AMP PROB: CPT | Performed by: NURSE PRACTITIONER

## 2022-10-31 PROCEDURE — 80074 ACUTE HEPATITIS PANEL: CPT | Performed by: NURSE PRACTITIONER

## 2022-10-31 PROCEDURE — 87624 HPV HI-RISK TYP POOLED RSLT: CPT | Performed by: NURSE PRACTITIONER

## 2022-10-31 PROCEDURE — 36415 COLL VENOUS BLD VENIPUNCTURE: CPT | Performed by: NURSE PRACTITIONER

## 2022-10-31 PROCEDURE — 1159F MED LIST DOCD IN RCRD: CPT | Mod: CPTII,S$GLB,, | Performed by: NURSE PRACTITIONER

## 2022-10-31 PROCEDURE — 3078F PR MOST RECENT DIASTOLIC BLOOD PRESSURE < 80 MM HG: ICD-10-PCS | Mod: CPTII,S$GLB,, | Performed by: NURSE PRACTITIONER

## 2022-10-31 PROCEDURE — 3074F SYST BP LT 130 MM HG: CPT | Mod: CPTII,S$GLB,, | Performed by: NURSE PRACTITIONER

## 2022-10-31 PROCEDURE — 99999 PR PBB SHADOW E&M-EST. PATIENT-LVL III: CPT | Mod: PBBFAC,,, | Performed by: NURSE PRACTITIONER

## 2022-10-31 PROCEDURE — 1160F PR REVIEW ALL MEDS BY PRESCRIBER/CLIN PHARMACIST DOCUMENTED: ICD-10-PCS | Mod: CPTII,S$GLB,, | Performed by: NURSE PRACTITIONER

## 2022-10-31 PROCEDURE — 99999 PR PBB SHADOW E&M-EST. PATIENT-LVL III: ICD-10-PCS | Mod: PBBFAC,,, | Performed by: NURSE PRACTITIONER

## 2022-10-31 PROCEDURE — 88175 CYTOPATH C/V AUTO FLUID REDO: CPT | Performed by: NURSE PRACTITIONER

## 2022-10-31 PROCEDURE — 3074F PR MOST RECENT SYSTOLIC BLOOD PRESSURE < 130 MM HG: ICD-10-PCS | Mod: CPTII,S$GLB,, | Performed by: NURSE PRACTITIONER

## 2022-10-31 PROCEDURE — 87389 HIV-1 AG W/HIV-1&-2 AB AG IA: CPT | Performed by: NURSE PRACTITIONER

## 2022-10-31 PROCEDURE — 1159F PR MEDICATION LIST DOCUMENTED IN MEDICAL RECORD: ICD-10-PCS | Mod: CPTII,S$GLB,, | Performed by: NURSE PRACTITIONER

## 2022-10-31 PROCEDURE — 99395 PREV VISIT EST AGE 18-39: CPT | Mod: S$GLB,,, | Performed by: NURSE PRACTITIONER

## 2022-10-31 PROCEDURE — 3078F DIAST BP <80 MM HG: CPT | Mod: CPTII,S$GLB,, | Performed by: NURSE PRACTITIONER

## 2022-10-31 PROCEDURE — 87491 CHLMYD TRACH DNA AMP PROBE: CPT | Performed by: NURSE PRACTITIONER

## 2022-10-31 PROCEDURE — 81514 NFCT DS BV&VAGINITIS DNA ALG: CPT | Performed by: NURSE PRACTITIONER

## 2022-10-31 PROCEDURE — 86592 SYPHILIS TEST NON-TREP QUAL: CPT | Performed by: NURSE PRACTITIONER

## 2022-10-31 PROCEDURE — 99395 PR PREVENTIVE VISIT,EST,18-39: ICD-10-PCS | Mod: S$GLB,,, | Performed by: NURSE PRACTITIONER

## 2022-10-31 NOTE — PROGRESS NOTES
CC: Annual    HPI: Pt is a 37 y.o. female who presents for routine annual exam.     Would like to be checked for yeast infection.    PAP: 2018= negative, HPV= negative, would like PAP today    Menstrual cycle: monthly, duration= 3 days, not heavy, denies severe pain  Contraception: declines  STD screening- would like vaginal and blood testing   Exercise: walking  Smoking history: no  Wears seatbelts    Denies domestic violence     Past Medical History:   Diagnosis Date    ADD (attention deficit disorder) 2014    Adderall XR 20mg qd, 5 mg plain in pm, tried Concerta but didn't help, ADD eval on chart 2013    Allergic dermatitis     takes medrol prior to & after getting tatoos bc of hives    Seborrheic dermatitis of scalp     ketoconazole 2% shampoo helps       Past Surgical History:   Procedure Laterality Date    breast augmentation      CHOLECYSTECTOMY      CYST REMOVAL         OB History    Para Term  AB Living   2 1     1     SAB IAB Ectopic Multiple Live Births   0     0        # Outcome Date GA Lbr Odilon/2nd Weight Sex Delivery Anes PTL Lv   2 Para 18 19w6d / 00:07 0.325 kg (11.5 oz) M Vag-Spont EPI Y FD   1 AB                Family History   Problem Relation Age of Onset    Breast cancer Neg Hx     Cancer Neg Hx     Ovarian cancer Neg Hx     Colon cancer Neg Hx        Social History     Socioeconomic History    Marital status:    Tobacco Use    Smoking status: Never    Smokeless tobacco: Never   Substance and Sexual Activity    Alcohol use: Yes     Comment: socially    Drug use: Yes     Frequency: 7.0 times per week     Types: Marijuana    Sexual activity: Yes     Partners: Male     Birth control/protection: None   Social History Narrative    Accounting asst, , no children, nonsmoker, nondrinker, GYN nml        /73   Wt 52.9 kg (116 lb 10 oz)   LMP 10/24/2022 (Approximate)   BMI 23.56 kg/m²       ROS:  GENERAL: Feeling well overall. Denies fever or chills.    SKIN: Denies rash or lesions.   HEAD: Denies head injury or headache.   NODES: Denies enlarged lymph nodes.   CHEST: Denies chest pain or shortness of breath.   CARDIOVASCULAR: Denies palpitations or left sided chest pain.   ABDOMEN: No abdominal pain, constipation, diarrhea, nausea, vomiting or rectal bleeding.   URINARY: No dysuria, hematuria, or burning on urination.  REPRODUCTIVE: See HPI.   BREASTS: Denies pain, lumps, or nipple discharge.   HEMATOLOGIC: No easy bruisability or excessive bleeding.   MUSCULOSKELETAL: Denies joint pain or swelling.   NEUROLOGIC: Denies syncope or weakness.   PSYCHIATRIC: Denies depression, anxiety or mood swings.    PE:   APPEARANCE: Well nourished, well developed, in no acute distress.  AFFECT: WNL, alert and oriented x 3  SKIN: No acne or hirsutism  NECK: Neck symmetric  NODES: No inguinal, cervical, axillary, or femoral lymph node enlargement  CHEST: Good respiratory effect  ABDOMEN: Soft.  No tenderness or masses. Nondistended.  BREASTS: Symmetrical, no skin changes or visible lesions.  No palpable masses, nipple discharge bilaterally.  PELVIC: Normal external genitalia without lesions.  Normal hair distribution.  Adequate perineal body, normal urethral meatus.  Vagina moist and well rugated without lesions or discharge.  Cervix pink, without lesions, discharge or tenderness.  No significant cystocele or rectocele.  Bimanual exam shows uterus to be normal size, regular, mobile and nontender.  Adnexa without masses or tenderness.    Anus Perineum:No lesions, no relaxation, no external hemorrhoids.  EXTREMITIES: No edema.      ASSESSMENT AND PLAN  1. Well woman exam        2. Screening examination for sexually transmitted disease  HIV 1/2 Ag/Ab (4th Gen)    RPR    Hepatitis Panel, Acute    C. trachomatis/N. gonorrhoeae by AMP DNA      3. Encounter for Papanicolaou smear for cervical cancer screening  HPV High Risk Genotypes, PCR    Liquid-Based Pap Smear, Screening      4.  Acute vaginitis  Vaginosis Screen by DNA Probe          Patient was counseled today on A.C.S. Pap guidelines and recommendations for yearly pelvic exams, mammograms and monthly self breast exams; to see her PCP for other health maintenance.     All questions answered, patient verbalizes understanding.     Follow-up with in 1 year for routine exam and PRN.

## 2022-11-01 LAB
C TRACH DNA SPEC QL NAA+PROBE: DETECTED
N GONORRHOEA DNA SPEC QL NAA+PROBE: NOT DETECTED
RPR SER QL: NORMAL

## 2022-11-02 ENCOUNTER — PATIENT MESSAGE (OUTPATIENT)
Dept: OBSTETRICS AND GYNECOLOGY | Facility: CLINIC | Age: 37
End: 2022-11-02
Payer: COMMERCIAL

## 2022-11-02 DIAGNOSIS — B96.89 BACTERIAL VAGINOSIS: Primary | ICD-10-CM

## 2022-11-02 DIAGNOSIS — B96.89 BACTERIAL VAGINOSIS: ICD-10-CM

## 2022-11-02 DIAGNOSIS — N76.0 BACTERIAL VAGINOSIS: Primary | ICD-10-CM

## 2022-11-02 DIAGNOSIS — A74.9 CHLAMYDIA INFECTION: Primary | ICD-10-CM

## 2022-11-02 DIAGNOSIS — N76.0 BACTERIAL VAGINOSIS: ICD-10-CM

## 2022-11-02 LAB
BACTERIAL VAGINOSIS DNA: POSITIVE
CANDIDA GLABRATA DNA: NEGATIVE
CANDIDA KRUSEI DNA: NEGATIVE
CANDIDA RRNA VAG QL PROBE: NEGATIVE
T VAGINALIS RRNA GENITAL QL PROBE: NEGATIVE

## 2022-11-02 RX ORDER — DOXYCYCLINE 100 MG/1
100 CAPSULE ORAL 2 TIMES DAILY
Qty: 14 CAPSULE | Refills: 0 | Status: SHIPPED | OUTPATIENT
Start: 2022-11-02 | End: 2022-11-09

## 2022-11-02 RX ORDER — TINIDAZOLE 500 MG/1
2 TABLET ORAL DAILY
Qty: 8 TABLET | Refills: 1 | Status: SHIPPED | OUTPATIENT
Start: 2022-11-02 | End: 2022-11-04

## 2022-11-04 ENCOUNTER — PATIENT MESSAGE (OUTPATIENT)
Dept: OBSTETRICS AND GYNECOLOGY | Facility: CLINIC | Age: 37
End: 2022-11-04
Payer: COMMERCIAL

## 2022-11-04 RX ORDER — METRONIDAZOLE 7.5 MG/G
1 GEL VAGINAL NIGHTLY
Qty: 25 G | Refills: 1 | Status: SHIPPED | OUTPATIENT
Start: 2022-11-04 | End: 2022-11-09

## 2022-11-16 ENCOUNTER — OFFICE VISIT (OUTPATIENT)
Dept: OBSTETRICS AND GYNECOLOGY | Facility: CLINIC | Age: 37
End: 2022-11-16
Payer: COMMERCIAL

## 2022-11-16 VITALS
DIASTOLIC BLOOD PRESSURE: 82 MMHG | BODY MASS INDEX: 22.17 KG/M2 | SYSTOLIC BLOOD PRESSURE: 134 MMHG | WEIGHT: 109.81 LBS

## 2022-11-16 DIAGNOSIS — A74.9 CHLAMYDIA INFECTION: Primary | ICD-10-CM

## 2022-11-16 PROCEDURE — 99999 PR PBB SHADOW E&M-EST. PATIENT-LVL III: ICD-10-PCS | Mod: PBBFAC,,, | Performed by: NURSE PRACTITIONER

## 2022-11-16 PROCEDURE — 3075F PR MOST RECENT SYSTOLIC BLOOD PRESS GE 130-139MM HG: ICD-10-PCS | Mod: CPTII,S$GLB,, | Performed by: NURSE PRACTITIONER

## 2022-11-16 PROCEDURE — 3079F DIAST BP 80-89 MM HG: CPT | Mod: CPTII,S$GLB,, | Performed by: NURSE PRACTITIONER

## 2022-11-16 PROCEDURE — 99999 PR PBB SHADOW E&M-EST. PATIENT-LVL III: CPT | Mod: PBBFAC,,, | Performed by: NURSE PRACTITIONER

## 2022-11-16 PROCEDURE — 3075F SYST BP GE 130 - 139MM HG: CPT | Mod: CPTII,S$GLB,, | Performed by: NURSE PRACTITIONER

## 2022-11-16 PROCEDURE — 1160F PR REVIEW ALL MEDS BY PRESCRIBER/CLIN PHARMACIST DOCUMENTED: ICD-10-PCS | Mod: CPTII,S$GLB,, | Performed by: NURSE PRACTITIONER

## 2022-11-16 PROCEDURE — 3079F PR MOST RECENT DIASTOLIC BLOOD PRESSURE 80-89 MM HG: ICD-10-PCS | Mod: CPTII,S$GLB,, | Performed by: NURSE PRACTITIONER

## 2022-11-16 PROCEDURE — 1160F RVW MEDS BY RX/DR IN RCRD: CPT | Mod: CPTII,S$GLB,, | Performed by: NURSE PRACTITIONER

## 2022-11-16 PROCEDURE — 99213 OFFICE O/P EST LOW 20 MIN: CPT | Mod: S$GLB,,, | Performed by: NURSE PRACTITIONER

## 2022-11-16 PROCEDURE — 3008F PR BODY MASS INDEX (BMI) DOCUMENTED: ICD-10-PCS | Mod: CPTII,S$GLB,, | Performed by: NURSE PRACTITIONER

## 2022-11-16 PROCEDURE — 1159F PR MEDICATION LIST DOCUMENTED IN MEDICAL RECORD: ICD-10-PCS | Mod: CPTII,S$GLB,, | Performed by: NURSE PRACTITIONER

## 2022-11-16 PROCEDURE — 1159F MED LIST DOCD IN RCRD: CPT | Mod: CPTII,S$GLB,, | Performed by: NURSE PRACTITIONER

## 2022-11-16 PROCEDURE — 99213 PR OFFICE/OUTPT VISIT, EST, LEVL III, 20-29 MIN: ICD-10-PCS | Mod: S$GLB,,, | Performed by: NURSE PRACTITIONER

## 2022-11-16 PROCEDURE — 3008F BODY MASS INDEX DOCD: CPT | Mod: CPTII,S$GLB,, | Performed by: NURSE PRACTITIONER

## 2022-11-16 RX ORDER — AZITHROMYCIN 500 MG/1
1000 TABLET, FILM COATED ORAL ONCE
Qty: 2 TABLET | Refills: 0 | Status: SHIPPED | OUTPATIENT
Start: 2022-11-16 | End: 2022-11-16

## 2022-11-16 NOTE — PROGRESS NOTES
CC: Chlamydia infection     HPI: Pt is a 37 y.o.  female  who presents stating she does not think chlamydia has cleared up. Positive chlamydia 10/31/2022, completed 7 day doxycycline course 1 weeks ago. States partner recently started treatment, but they continued to have intercourse after she was diagnosed.She reports sore throat. She has completed treatment for BV. Reports vaginal discharge, however she's about to start cycle today.       Past Medical History:   Diagnosis Date    ADD (attention deficit disorder) 2014    Adderall XR 20mg qd, 5 mg plain in pm, tried Concerta but didn't help, ADD eval on chart 2013    Allergic dermatitis     takes medrol prior to & after getting tatoos bc of hives    Seborrheic dermatitis of scalp     ketoconazole 2% shampoo helps       Past Surgical History:   Procedure Laterality Date    breast augmentation      CHOLECYSTECTOMY      CYST REMOVAL         OB History    Para Term  AB Living   2 1     1     SAB IAB Ectopic Multiple Live Births   0     0        # Outcome Date GA Lbr Odilon/2nd Weight Sex Delivery Anes PTL Lv   2 Para 18 19w6d / 00:07 0.325 kg (11.5 oz) M Vag-Spont EPI Y FD   1 AB                Family History   Problem Relation Age of Onset    Breast cancer Neg Hx     Cancer Neg Hx     Ovarian cancer Neg Hx     Colon cancer Neg Hx        Social History     Socioeconomic History    Marital status:    Tobacco Use    Smoking status: Never    Smokeless tobacco: Never   Substance and Sexual Activity    Alcohol use: Yes     Comment: socially    Drug use: Yes     Frequency: 7.0 times per week     Types: Marijuana    Sexual activity: Yes     Partners: Male     Birth control/protection: None   Social History Narrative    Accounting asst, , no children, nonsmoker, nondrinker, GYN nml        /82   Wt 49.8 kg (109 lb 12.6 oz)   LMP 10/24/2022 (Approximate)   BMI 22.17 kg/m²     ROS:  GENERAL: No fever, chills, fatigability or  weight loss.  VULVAR: No pain, no lesions and no itching.  VAGINAL: No relaxation, no itching, + discharge, no abnormal bleeding and no lesions.  ABDOMEN: No abdominal pain. Denies nausea. Denies vomiting. No diarrhea. No constipation  BREAST: Denies pain. No lumps. No discharge.  URINARY: No incontinence, no nocturia, no frequency and no dysuria.  CARDIOVASCULAR: No chest pain. No shortness of breath. No leg cramps.  NEUROLOGICAL: No headaches. No vision changes.    PE:   APPEARANCE: Well nourished, well developed, in no acute distress.  AFFECT: Alert and oriented x 3  SKIN: Warm, dry, & intact. No acne or hirsutism.  NECK: Neck symmetric  NODES: No inguinal or femoral lymph node enlargement  CHEST:  Easy, even breaths.  ABDOMEN: Soft.  Nontender, nondistended.  PELVIC: Normal external genitalia without lesions.  Normal hair distribution.  Adequate perineal body, normal urethral meatus. Vagina moist and well rugated without lesions, brown discharge consistent with cycle.  Cervix pink, without lesions, discharge or tenderness.  No significant cystocele or rectocele. Bimanual exam shows uterus to be normal size, regular, mobile and nontender.  Adnexa without masses or tenderness.   Anus Perineum:No lesions, no relaxation, no external hemorrhoids.  EXTREMITIES: No edema.    ASSESSMENT AND PLAN  1. Chlamydia infection  azithromycin (ZITHROMAX) 500 MG tablet        Discussed:  -Too early for test of cure, will likely have positive result, will retreat today due continued exposure. Discussed importance of no sexual contact oral or vaginally until about 2 weeks after both have completed treatment. Return for test of cure in 12 weeks.   -Recommend eval with PCP for sore throat for cultures.     Follow-up: Test of cure

## 2023-01-24 ENCOUNTER — LAB VISIT (OUTPATIENT)
Dept: LAB | Facility: HOSPITAL | Age: 38
End: 2023-01-24
Attending: NURSE PRACTITIONER
Payer: COMMERCIAL

## 2023-01-24 ENCOUNTER — OFFICE VISIT (OUTPATIENT)
Dept: OBSTETRICS AND GYNECOLOGY | Facility: CLINIC | Age: 38
End: 2023-01-24
Payer: COMMERCIAL

## 2023-01-24 VITALS
BODY MASS INDEX: 21.12 KG/M2 | DIASTOLIC BLOOD PRESSURE: 62 MMHG | WEIGHT: 104.75 LBS | HEIGHT: 59 IN | SYSTOLIC BLOOD PRESSURE: 100 MMHG

## 2023-01-24 DIAGNOSIS — Z11.3 SCREEN FOR STD (SEXUALLY TRANSMITTED DISEASE): ICD-10-CM

## 2023-01-24 DIAGNOSIS — Z11.3 SCREEN FOR STD (SEXUALLY TRANSMITTED DISEASE): Primary | ICD-10-CM

## 2023-01-24 DIAGNOSIS — N89.8 VAGINAL ODOR: ICD-10-CM

## 2023-01-24 PROCEDURE — 86592 SYPHILIS TEST NON-TREP QUAL: CPT | Performed by: NURSE PRACTITIONER

## 2023-01-24 PROCEDURE — 99213 OFFICE O/P EST LOW 20 MIN: CPT | Mod: S$GLB,,, | Performed by: NURSE PRACTITIONER

## 2023-01-24 PROCEDURE — 3078F PR MOST RECENT DIASTOLIC BLOOD PRESSURE < 80 MM HG: ICD-10-PCS | Mod: CPTII,S$GLB,, | Performed by: NURSE PRACTITIONER

## 2023-01-24 PROCEDURE — 36415 COLL VENOUS BLD VENIPUNCTURE: CPT | Mod: PN | Performed by: NURSE PRACTITIONER

## 2023-01-24 PROCEDURE — 87389 HIV-1 AG W/HIV-1&-2 AB AG IA: CPT | Performed by: NURSE PRACTITIONER

## 2023-01-24 PROCEDURE — 87591 N.GONORRHOEAE DNA AMP PROB: CPT | Performed by: NURSE PRACTITIONER

## 2023-01-24 PROCEDURE — 3074F SYST BP LT 130 MM HG: CPT | Mod: CPTII,S$GLB,, | Performed by: NURSE PRACTITIONER

## 2023-01-24 PROCEDURE — 1159F PR MEDICATION LIST DOCUMENTED IN MEDICAL RECORD: ICD-10-PCS | Mod: CPTII,S$GLB,, | Performed by: NURSE PRACTITIONER

## 2023-01-24 PROCEDURE — 3074F PR MOST RECENT SYSTOLIC BLOOD PRESSURE < 130 MM HG: ICD-10-PCS | Mod: CPTII,S$GLB,, | Performed by: NURSE PRACTITIONER

## 2023-01-24 PROCEDURE — 99999 PR PBB SHADOW E&M-EST. PATIENT-LVL III: CPT | Mod: PBBFAC,,, | Performed by: NURSE PRACTITIONER

## 2023-01-24 PROCEDURE — 1159F MED LIST DOCD IN RCRD: CPT | Mod: CPTII,S$GLB,, | Performed by: NURSE PRACTITIONER

## 2023-01-24 PROCEDURE — 3078F DIAST BP <80 MM HG: CPT | Mod: CPTII,S$GLB,, | Performed by: NURSE PRACTITIONER

## 2023-01-24 PROCEDURE — 3008F BODY MASS INDEX DOCD: CPT | Mod: CPTII,S$GLB,, | Performed by: NURSE PRACTITIONER

## 2023-01-24 PROCEDURE — 99213 PR OFFICE/OUTPT VISIT, EST, LEVL III, 20-29 MIN: ICD-10-PCS | Mod: S$GLB,,, | Performed by: NURSE PRACTITIONER

## 2023-01-24 PROCEDURE — 3008F PR BODY MASS INDEX (BMI) DOCUMENTED: ICD-10-PCS | Mod: CPTII,S$GLB,, | Performed by: NURSE PRACTITIONER

## 2023-01-24 PROCEDURE — 81514 NFCT DS BV&VAGINITIS DNA ALG: CPT | Performed by: NURSE PRACTITIONER

## 2023-01-24 PROCEDURE — 80074 ACUTE HEPATITIS PANEL: CPT | Performed by: NURSE PRACTITIONER

## 2023-01-24 PROCEDURE — 99999 PR PBB SHADOW E&M-EST. PATIENT-LVL III: ICD-10-PCS | Mod: PBBFAC,,, | Performed by: NURSE PRACTITIONER

## 2023-01-24 RX ORDER — METRONIDAZOLE 500 MG/1
500 TABLET ORAL 2 TIMES DAILY
Qty: 14 TABLET | Refills: 0 | Status: SHIPPED | OUTPATIENT
Start: 2023-01-24 | End: 2023-01-31

## 2023-01-24 NOTE — PROGRESS NOTES
CC: Screening for sexually transmitted infection    Dyan Ferrara is a 37 y.o. female  presents for STD screening  Patient's last menstrual period was 2023..  Denies any new rashes or lesions.  Denies pelvic or abdominal pain.  Denies vulvovaginal itching or irritation.  Reports vaginal odor and discharge.  Last pap: 10/22 - WNL  She recently broke off engagement.   She works as an - has lived in  for 8 yrs.     ROS:  GENERAL: Denies weight gain or weight loss. Feeling well overall.   SKIN: Denies rash or lesions.   HEAD: Denies head injury or headache.   NODES: Denies enlarged lymph nodes.   CHEST: Denies chest pain or shortness of breath.   CARDIOVASCULAR: Denies palpitations or left sided chest pain.   ABDOMEN: No abdominal pain, constipation, diarrhea, nausea, vomiting or rectal bleeding.   URINARY: No frequency, dysuria, hematuria, or burning on urination.  REPRODUCTIVE: See HPI.   BREASTS: The patient performs breast self-examination and denies pain, lumps, or nipple discharge.   HEMATOLOGIC: No easy bruisability or excessive bleeding.   MUSCULOSKELETAL: Denies joint pain or swelling.   NEUROLOGIC: Denies syncope or weakness.   PSYCHIATRIC: Denies depression, anxiety or mood swings.      PHYSICAL EXAM:    APPEARANCE: Well nourished, well developed, in no acute distress.  AFFECT: Alert and oriented x 3  SKIN: Warm, dry, & intact. No acne or hirsutism.  NECK: Neck symmetric  NODES: No inguinal or femoral lymph node enlargement  CHEST:  Easy, even breaths.  ABDOMEN: Soft.  Nontender, nondistended.  PELVIC: Normal external genitalia without lesions.  Normal hair distribution.  Adequate perineal body, normal urethral meatus.    Vagina moist and well rugated without lesions or discharge.  Cervix pink, without lesions, + thin discharge or tenderness.  No significant cystocele or rectocele.    Bimanual exam shows uterus to be normal size, regular, mobile and nontender.  Adnexa without masses  or tenderness.    EXTREMITIES: No edema.    Screen for STD (sexually transmitted disease)  -     C. trachomatis/N. gonorrhoeae by AMP DNA  -     HIV-1 and HIV-2 antibodies; Future; Expected date: 01/24/2023  -     RPR; Future; Expected date: 01/24/2023  -     Vaginosis Screen by DNA Probe  -     Hepatitis panel, acute; Future; Expected date: 01/24/2023    Vaginal odor  -     metroNIDAZOLE (FLAGYL) 500 MG tablet; Take 1 tablet (500 mg total) by mouth 2 (two) times daily. for 7 days  Dispense: 14 tablet; Refill: 0          STD screening   Flagyl  for suspected BV    Patient was counseled today on prevention of STDs with use of condoms.  We also reviewed [place] Pap guidelines and recommendations for yearly pelvic exams, mammograms and monthly self breast exams; to see her PCP for other health maintenance.     Followup pending lab results    Madisyn Jackman, DONTRELLC

## 2023-01-25 LAB — RPR SER QL: NORMAL

## 2023-01-26 LAB
C TRACH DNA SPEC QL NAA+PROBE: NOT DETECTED
N GONORRHOEA DNA SPEC QL NAA+PROBE: NOT DETECTED

## 2023-02-08 ENCOUNTER — PATIENT MESSAGE (OUTPATIENT)
Dept: OBSTETRICS AND GYNECOLOGY | Facility: CLINIC | Age: 38
End: 2023-02-08
Payer: COMMERCIAL